# Patient Record
Sex: MALE | Race: BLACK OR AFRICAN AMERICAN | NOT HISPANIC OR LATINO
[De-identification: names, ages, dates, MRNs, and addresses within clinical notes are randomized per-mention and may not be internally consistent; named-entity substitution may affect disease eponyms.]

---

## 2023-08-25 ENCOUNTER — TRANSCRIPTION ENCOUNTER (OUTPATIENT)
Age: 53
End: 2023-08-25

## 2023-08-26 ENCOUNTER — INPATIENT (INPATIENT)
Facility: HOSPITAL | Age: 53
LOS: 8 days | Discharge: HOME | End: 2023-09-04
Attending: INTERNAL MEDICINE | Admitting: INTERNAL MEDICINE
Payer: MEDICAID

## 2023-08-26 VITALS
HEART RATE: 98 BPM | OXYGEN SATURATION: 99 % | WEIGHT: 151.9 LBS | TEMPERATURE: 99 F | DIASTOLIC BLOOD PRESSURE: 91 MMHG | RESPIRATION RATE: 18 BRPM | SYSTOLIC BLOOD PRESSURE: 128 MMHG

## 2023-08-26 LAB
ALBUMIN SERPL ELPH-MCNC: 3.3 G/DL — SIGNIFICANT CHANGE UP (ref 3.3–5)
ALP SERPL-CCNC: 61 U/L — SIGNIFICANT CHANGE UP (ref 40–120)
ALT FLD-CCNC: 26 U/L — SIGNIFICANT CHANGE UP (ref 12–78)
ANION GAP SERPL CALC-SCNC: 5 MMOL/L — SIGNIFICANT CHANGE UP (ref 5–17)
APPEARANCE UR: ABNORMAL
AST SERPL-CCNC: 21 U/L — SIGNIFICANT CHANGE UP (ref 15–37)
BACTERIA # UR AUTO: ABNORMAL
BASOPHILS # BLD AUTO: 0.05 K/UL — SIGNIFICANT CHANGE UP (ref 0–0.2)
BASOPHILS NFR BLD AUTO: 0.3 % — SIGNIFICANT CHANGE UP (ref 0–2)
BILIRUB SERPL-MCNC: 0.6 MG/DL — SIGNIFICANT CHANGE UP (ref 0.2–1.2)
BILIRUB UR-MCNC: NEGATIVE — SIGNIFICANT CHANGE UP
BLD GP AB SCN SERPL QL: SIGNIFICANT CHANGE UP
BUN SERPL-MCNC: 30 MG/DL — HIGH (ref 7–23)
CALCIUM SERPL-MCNC: 9.4 MG/DL — SIGNIFICANT CHANGE UP (ref 8.5–10.1)
CHLORIDE SERPL-SCNC: 104 MMOL/L — SIGNIFICANT CHANGE UP (ref 96–108)
CK SERPL-CCNC: 194 U/L — SIGNIFICANT CHANGE UP (ref 26–308)
CO2 SERPL-SCNC: 31 MMOL/L — SIGNIFICANT CHANGE UP (ref 22–31)
COD CRY URNS QL: ABNORMAL
COLOR SPEC: YELLOW — SIGNIFICANT CHANGE UP
COMMENT - URINE: SIGNIFICANT CHANGE UP
CREAT SERPL-MCNC: 1.45 MG/DL — HIGH (ref 0.5–1.3)
DIFF PNL FLD: ABNORMAL
EGFR: 58 ML/MIN/1.73M2 — LOW
EOSINOPHIL # BLD AUTO: 0.08 K/UL — SIGNIFICANT CHANGE UP (ref 0–0.5)
EOSINOPHIL NFR BLD AUTO: 0.5 % — SIGNIFICANT CHANGE UP (ref 0–6)
EPI CELLS # UR: ABNORMAL
GLUCOSE SERPL-MCNC: 140 MG/DL — HIGH (ref 70–99)
GLUCOSE UR QL: NEGATIVE MG/DL — SIGNIFICANT CHANGE UP
HCT VFR BLD CALC: 43.9 % — SIGNIFICANT CHANGE UP (ref 39–50)
HGB BLD-MCNC: 15.2 G/DL — SIGNIFICANT CHANGE UP (ref 13–17)
IMM GRANULOCYTES NFR BLD AUTO: 0.3 % — SIGNIFICANT CHANGE UP (ref 0–0.9)
INR BLD: 1.08 RATIO — SIGNIFICANT CHANGE UP (ref 0.85–1.18)
KETONES UR-MCNC: ABNORMAL
LACTATE SERPL-SCNC: 1.2 MMOL/L — SIGNIFICANT CHANGE UP (ref 0.7–2)
LACTATE SERPL-SCNC: 3.2 MMOL/L — HIGH (ref 0.7–2)
LEUKOCYTE ESTERASE UR-ACNC: ABNORMAL
LIDOCAIN IGE QN: 41 U/L — SIGNIFICANT CHANGE UP (ref 13–75)
LYMPHOCYTES # BLD AUTO: 24.9 % — SIGNIFICANT CHANGE UP (ref 13–44)
LYMPHOCYTES # BLD AUTO: 3.65 K/UL — HIGH (ref 1–3.3)
MCHC RBC-ENTMCNC: 28.5 PG — SIGNIFICANT CHANGE UP (ref 27–34)
MCHC RBC-ENTMCNC: 34.6 G/DL — SIGNIFICANT CHANGE UP (ref 32–36)
MCV RBC AUTO: 82.2 FL — SIGNIFICANT CHANGE UP (ref 80–100)
MONOCYTES # BLD AUTO: 1.29 K/UL — HIGH (ref 0–0.9)
MONOCYTES NFR BLD AUTO: 8.8 % — SIGNIFICANT CHANGE UP (ref 2–14)
NEUTROPHILS # BLD AUTO: 9.52 K/UL — HIGH (ref 1.8–7.4)
NEUTROPHILS NFR BLD AUTO: 65.2 % — SIGNIFICANT CHANGE UP (ref 43–77)
NITRITE UR-MCNC: NEGATIVE — SIGNIFICANT CHANGE UP
NRBC # BLD: 0 /100 WBCS — SIGNIFICANT CHANGE UP (ref 0–0)
PH UR: 7 — SIGNIFICANT CHANGE UP (ref 5–8)
PLATELET # BLD AUTO: 267 K/UL — SIGNIFICANT CHANGE UP (ref 150–400)
POTASSIUM SERPL-MCNC: 4.8 MMOL/L — SIGNIFICANT CHANGE UP (ref 3.5–5.3)
POTASSIUM SERPL-SCNC: 4.8 MMOL/L — SIGNIFICANT CHANGE UP (ref 3.5–5.3)
PROT SERPL-MCNC: 7 GM/DL — SIGNIFICANT CHANGE UP (ref 6–8.3)
PROT UR-MCNC: 100 MG/DL
PROTHROM AB SERPL-ACNC: 12.9 SEC — SIGNIFICANT CHANGE UP (ref 9.5–13)
RBC # BLD: 5.34 M/UL — SIGNIFICANT CHANGE UP (ref 4.2–5.8)
RBC # FLD: 12.6 % — SIGNIFICANT CHANGE UP (ref 10.3–14.5)
RBC CASTS # UR COMP ASSIST: ABNORMAL /HPF (ref 0–4)
SODIUM SERPL-SCNC: 140 MMOL/L — SIGNIFICANT CHANGE UP (ref 135–145)
SP GR SPEC: 1.01 — SIGNIFICANT CHANGE UP (ref 1.01–1.02)
TROPONIN I, HIGH SENSITIVITY RESULT: 4.3 NG/L — SIGNIFICANT CHANGE UP
UROBILINOGEN FLD QL: NEGATIVE MG/DL — SIGNIFICANT CHANGE UP
WBC # BLD: 14.63 K/UL — HIGH (ref 3.8–10.5)
WBC # FLD AUTO: 14.63 K/UL — HIGH (ref 3.8–10.5)
WBC UR QL: ABNORMAL

## 2023-08-26 PROCEDURE — 71045 X-RAY EXAM CHEST 1 VIEW: CPT | Mod: 26

## 2023-08-26 PROCEDURE — 99222 1ST HOSP IP/OBS MODERATE 55: CPT

## 2023-08-26 PROCEDURE — 99291 CRITICAL CARE FIRST HOUR: CPT | Mod: 25

## 2023-08-26 PROCEDURE — 70450 CT HEAD/BRAIN W/O DYE: CPT | Mod: 26

## 2023-08-26 PROCEDURE — 93010 ELECTROCARDIOGRAM REPORT: CPT

## 2023-08-26 PROCEDURE — 74177 CT ABD & PELVIS W/CONTRAST: CPT | Mod: 26,MA

## 2023-08-26 RX ORDER — SODIUM CHLORIDE 9 MG/ML
1000 INJECTION INTRAMUSCULAR; INTRAVENOUS; SUBCUTANEOUS ONCE
Refills: 0 | Status: COMPLETED | OUTPATIENT
Start: 2023-08-26 | End: 2023-08-26

## 2023-08-26 RX ORDER — HYDROMORPHONE HYDROCHLORIDE 2 MG/ML
0.5 INJECTION INTRAMUSCULAR; INTRAVENOUS; SUBCUTANEOUS EVERY 4 HOURS
Refills: 0 | Status: DISCONTINUED | OUTPATIENT
Start: 2023-08-26 | End: 2023-08-28

## 2023-08-26 RX ORDER — SODIUM CHLORIDE 9 MG/ML
1000 INJECTION INTRAMUSCULAR; INTRAVENOUS; SUBCUTANEOUS
Refills: 0 | Status: DISCONTINUED | OUTPATIENT
Start: 2023-08-26 | End: 2023-08-31

## 2023-08-26 RX ORDER — MORPHINE SULFATE 50 MG/1
4 CAPSULE, EXTENDED RELEASE ORAL ONCE
Refills: 0 | Status: DISCONTINUED | OUTPATIENT
Start: 2023-08-26 | End: 2023-08-26

## 2023-08-26 RX ORDER — PANTOPRAZOLE SODIUM 20 MG/1
40 TABLET, DELAYED RELEASE ORAL DAILY
Refills: 0 | Status: DISCONTINUED | OUTPATIENT
Start: 2023-08-27 | End: 2023-08-31

## 2023-08-26 RX ORDER — ONDANSETRON 8 MG/1
4 TABLET, FILM COATED ORAL EVERY 8 HOURS
Refills: 0 | Status: DISCONTINUED | OUTPATIENT
Start: 2023-08-26 | End: 2023-08-31

## 2023-08-26 RX ORDER — CEFTRIAXONE 500 MG/1
1000 INJECTION, POWDER, FOR SOLUTION INTRAMUSCULAR; INTRAVENOUS EVERY 24 HOURS
Refills: 0 | Status: DISCONTINUED | OUTPATIENT
Start: 2023-08-27 | End: 2023-08-31

## 2023-08-26 RX ORDER — ONDANSETRON 8 MG/1
4 TABLET, FILM COATED ORAL ONCE
Refills: 0 | Status: COMPLETED | OUTPATIENT
Start: 2023-08-26 | End: 2023-08-26

## 2023-08-26 RX ORDER — PANTOPRAZOLE SODIUM 20 MG/1
8 TABLET, DELAYED RELEASE ORAL
Qty: 80 | Refills: 0 | Status: DISCONTINUED | OUTPATIENT
Start: 2023-08-26 | End: 2023-08-26

## 2023-08-26 RX ORDER — PANTOPRAZOLE SODIUM 20 MG/1
80 TABLET, DELAYED RELEASE ORAL ONCE
Refills: 0 | Status: COMPLETED | OUTPATIENT
Start: 2023-08-26 | End: 2023-08-26

## 2023-08-26 RX ORDER — CEFTRIAXONE 500 MG/1
1000 INJECTION, POWDER, FOR SOLUTION INTRAMUSCULAR; INTRAVENOUS ONCE
Refills: 0 | Status: COMPLETED | OUTPATIENT
Start: 2023-08-26 | End: 2023-08-26

## 2023-08-26 RX ORDER — HEPARIN SODIUM 5000 [USP'U]/ML
5000 INJECTION INTRAVENOUS; SUBCUTANEOUS EVERY 12 HOURS
Refills: 0 | Status: DISCONTINUED | OUTPATIENT
Start: 2023-08-26 | End: 2023-08-31

## 2023-08-26 RX ADMIN — SODIUM CHLORIDE 1000 MILLILITER(S): 9 INJECTION INTRAMUSCULAR; INTRAVENOUS; SUBCUTANEOUS at 05:09

## 2023-08-26 RX ADMIN — CEFTRIAXONE 100 MILLIGRAM(S): 500 INJECTION, POWDER, FOR SOLUTION INTRAMUSCULAR; INTRAVENOUS at 06:53

## 2023-08-26 RX ADMIN — SODIUM CHLORIDE 1000 MILLILITER(S): 9 INJECTION INTRAMUSCULAR; INTRAVENOUS; SUBCUTANEOUS at 02:19

## 2023-08-26 RX ADMIN — SODIUM CHLORIDE 100 MILLILITER(S): 9 INJECTION INTRAMUSCULAR; INTRAVENOUS; SUBCUTANEOUS at 12:54

## 2023-08-26 RX ADMIN — ONDANSETRON 4 MILLIGRAM(S): 8 TABLET, FILM COATED ORAL at 02:20

## 2023-08-26 RX ADMIN — PANTOPRAZOLE SODIUM 80 MILLIGRAM(S): 20 TABLET, DELAYED RELEASE ORAL at 02:49

## 2023-08-26 RX ADMIN — HYDROMORPHONE HYDROCHLORIDE 0.5 MILLIGRAM(S): 2 INJECTION INTRAMUSCULAR; INTRAVENOUS; SUBCUTANEOUS at 13:40

## 2023-08-26 RX ADMIN — PANTOPRAZOLE SODIUM 10 MG/HR: 20 TABLET, DELAYED RELEASE ORAL at 10:53

## 2023-08-26 RX ADMIN — PANTOPRAZOLE SODIUM 10 MG/HR: 20 TABLET, DELAYED RELEASE ORAL at 03:37

## 2023-08-26 RX ADMIN — HYDROMORPHONE HYDROCHLORIDE 0.5 MILLIGRAM(S): 2 INJECTION INTRAMUSCULAR; INTRAVENOUS; SUBCUTANEOUS at 20:45

## 2023-08-26 RX ADMIN — MORPHINE SULFATE 4 MILLIGRAM(S): 50 CAPSULE, EXTENDED RELEASE ORAL at 02:21

## 2023-08-26 RX ADMIN — HEPARIN SODIUM 5000 UNIT(S): 5000 INJECTION INTRAVENOUS; SUBCUTANEOUS at 17:30

## 2023-08-26 RX ADMIN — HYDROMORPHONE HYDROCHLORIDE 0.5 MILLIGRAM(S): 2 INJECTION INTRAMUSCULAR; INTRAVENOUS; SUBCUTANEOUS at 12:54

## 2023-08-26 NOTE — ED ADULT NURSE REASSESSMENT NOTE - NS ED NURSE REASSESS COMMENT FT1
Pt suddenly experienced coffee ground emesis. Dr. Briceño at bedside. Obtained 2 sites IV access. Bloodwork sent to lab. Pt started on IV fluids. Taken to radiology stat, 2 nurses attended. Pt remained on cardiac monitor. Pt safely returned to his room, medication for nausea and pain given without incident. Family remains at bedside.

## 2023-08-26 NOTE — ED ADULT NURSE REASSESSMENT NOTE - NS ED NURSE REASSESS COMMENT FT1
patient received laying comfortable in stretcher, alert and oriented x4. pt has NGT in right nare with protonix drip and on continuos cardiac monitor and pulse ox. pt in no acute respiratory distress or discomfort at this time. pt admitted waiting for bed. fall precautions and safety precautions maintained.

## 2023-08-26 NOTE — H&P ADULT - NSHPLABSRESULTS_GEN_ALL_CORE
LABS:                        15.2   14.63 )-----------( 267      ( 26 Aug 2023 02:04 )             43.9         140  |  104  |  30<H>  ----------------------------<  140<H>  4.8   |  31  |  1.45<H>    Ca    9.4      26 Aug 2023 02:04    TPro  7.0  /  Alb  3.3  /  TBili  0.6  /  DBili  x   /  AST  21  /  ALT  26  /  AlkPhos  61      PT/INR - ( 26 Aug 2023 02:09 )   PT: 12.9 sec;   INR: 1.08 ratio           CARDIAC MARKERS ( 26 Aug 2023 02:04 )  x     / x     / 194 U/L / x     / x          Urinalysis Basic - ( 26 Aug 2023 05:26 )    Color: Yellow / Appearance: Slightly Turbid / S.010 / pH: x  Gluc: x / Ketone: Moderate  / Bili: Negative / Urobili: Negative mg/dL   Blood: x / Protein: 100 mg/dL / Nitrite: Negative   Leuk Esterase: Moderate / RBC: 25-50 /HPF / WBC 26-50   Sq Epi: x / Non Sq Epi: x / Bacteria: Moderate      Lactate, Blood: 1.2 mmol/L ( @ 06:45)  Lactate, Blood: 3.2 mmol/L ( @ 02:04)
Yes

## 2023-08-26 NOTE — ED PROVIDER NOTE - CARE PLAN
Principal Discharge DX:	Syncope  Secondary Diagnosis:	UTI (urinary tract infection)  Secondary Diagnosis:	Bilateral flank pain  Secondary Diagnosis:	Coffee ground emesis  Secondary Diagnosis:	Orthostatic hypotension   1

## 2023-08-26 NOTE — ED ADULT NURSE REASSESSMENT NOTE - NS ED NURSE REASSESS COMMENT FT1
Safety checks completed and maintained. IV sites checked and remains patent. medications given as ordered with no s/s of adverse reactions. NG tube remains in place. Pt still on cardiac monitor. Fall and skin precautions in place. Call bell within reach and safety measures in place.

## 2023-08-26 NOTE — ED ADULT NURSE REASSESSMENT NOTE - NS ED NURSE REASSESS COMMENT FT1
Safety checks completed and maintained. Bloodwork sent to lab and medication given  with no signs of adverse reactions. Pt a&ox4, with family still at bedside. IV sites checked and remains WDL. Fall and skin precautions in place. Call bell within reach and safety measures in place.

## 2023-08-26 NOTE — H&P ADULT - NSHPREVIEWOFSYSTEMS_GEN_ALL_CORE
REVIEW OF SYSTEMS:  CONSTITUTIONAL: No fever,  no  weight loss  ENT:  No  tinnitus,   no   vertigo  NECK: No pain or stiffness  RESPIRATORY: No cough, wheezing, chills or hemoptysis;    No Shortness of Breath  CARDIOVASCULAR: No chest pain, palpitations, dizziness  GASTROINTESTINAL: No abdominal or epigastric pain.   +  nausea, vomiting, o no hematemesis; No diarrhea  No melena or hematochezia.  GENITOURINARY: No dysuria, frequency, hematuria, or incontinence  NEUROLOGICAL: No headaches  SKIN: No itching,  no   rash  LYMPH Nodes: No enlarged glands  ENDOCRINE: No heat or cold intolerance  MUSCULOSKELETAL: No joint pain or swelling  PSYCHIATRIC: No depression, anxiety  HEME/LYMPH: No easy bruising, or bleeding gums  ALLERGY AND IMMUNOLOGIC: No hives or eczema

## 2023-08-26 NOTE — ED PROVIDER NOTE - WET READ LAUNCH FT
Problem: Anxiety  Goal: Anxiety Reduction or Resolution  Outcome: Improving     Pt mildly anxious today and gave 2mg PRN valium to help keep him more calm. Tremors are still present baseline in Bilateral upper extremities and RLE. No pain noted. On RA sating fine. Waiting TCU placement.    Selina Montiel RN     There are no Wet Read(s) to document. There is 1 Wet Read(s) to document.
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
## 2023-08-26 NOTE — H&P ADULT - NSHPPHYSICALEXAM_GEN_ALL_CORE
T(C): 36.7 (08-26-23 @ 11:07), Max: 37.1 (08-26-23 @ 01:09)  HR: 78 (08-26-23 @ 11:07) (78 - 98)  BP: 122/81 (08-26-23 @ 11:07) (119/79 - 134/90)  RR: 18 (08-26-23 @ 11:07) (14 - 18)  SpO2: 99% (08-26-23 @ 11:07) (97% - 99%)  GENERAL: NAD, lying in bed comfortably  HEAD:  Atraumatic, normocephalic  EYES: EOMI, PERRLA, conjunctiva and sclera clear  NECK: Supple, trachea midline, no JVD  HEART: Regular rate and rhythm, no murmurs, rubs, or gallops  LUNGS: Unlabored respirations.  Clear to auscultation bilaterally, no crackles, wheezing, or rhonchi  ABDOMEN: Soft, nontender, nondistended, +BS    b/l  flank  tenderness  EXTREMITIES: 2+ peripheral pulses bilaterally. No clubbing, cyanosis, or edema  NERVOUS SYSTEM:  A&Ox3, moving all extremities, no focal deficits   SKIN: No rashes or lesions

## 2023-08-26 NOTE — ED PROVIDER NOTE - OBJECTIVE STATEMENT
This patient is a 53 year old man with no known past medical history who presents to the ER c/o intermittent hematuria for a few months.  He denies fever and vomiting but does report intermittent bilateral flank pain right greater than left currently 8/10 in severity.  Today after getting up from bed and urinating he had a syncopal episode.  He was able to  himself from the ground went upstairs to another area of the house and passed out again in front of his wife.  No chest pain, SOB, or focal weakness.  He has never had these symptoms in the past.

## 2023-08-26 NOTE — ED ADULT NURSE NOTE - NSFALLRISKINTERV_ED_ALL_ED
Assistance OOB with selected safe patient handling equipment if applicable/Communicate fall risk and risk factors to all staff, patient, and family/Orthostatic vital signs/Provide visual cue: yellow wristband, yellow gown, etc/Reinforce activity limits and safety measures with patient and family/Call bell, personal items and telephone in reach/Instruct patient to call for assistance before getting out of bed/chair/stretcher/Non-slip footwear applied when patient is off stretcher/[place] to call system/Physically safe environment - no spills, clutter or unnecessary equipment/Purposeful Proactive Rounding/Room/bathroom lighting operational, light cord in reach

## 2023-08-26 NOTE — ED ADULT NURSE NOTE - ED STAT RN HANDOFF DETAILS
report given to JEANMARIE Jeffries in ED Hold. pt on continuos cardiac monitor, NSR, and pulse ox, in no acute respiratory distress or discomfort at this time. denies pain or discomfort at this moment. fall precautions and safety precautions maintained.
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
## 2023-08-26 NOTE — ED ADULT NURSE NOTE - PRO INTERPRETER NEED 2
Pt seen in office 6/17 by Dr. Sinha and was prescribed Eliquis. Dtr. Faye calling asking if Rx can be changed to 3 months to use [facility] mailorder pharmacy.     Please review and advise. Call Faye at [phone] to update or for questions.    Raghav Vigil , [facility] [place]  06/29/20 4:15 PM     
Routing refill request to provider for review/approval because:  Outside of age range.     Patient requesting a 3 month supply mail order.     Katiana Ramirez RN on 6/30/2020 at 9:12 AM          
English

## 2023-08-26 NOTE — H&P ADULT - ASSESSMENT
53  yr  m    with   no  pmx/  an d takes  no  meds      c/o  b/l flank pain  fpr  past 4  months or  more    fiancee  at bedside    denies   fevers/ n// cp/sob/  ab d pain    had  vomiting   fpr  past  2 days. , and   er  had  placed   an  ng  tube/  no  further   vomiting      admitted with  b.l  flank pain. form  kidney stone       CT a/p,  2  cm R ,a  nd  2.1  cm L calculi,  renal pelvis,  stranding   noted,              atherosclerosis,  PAD, L and R Internal  Iliac A   elevated  wbc/  pyelonephritis    on iv  fluids    pain  meds    iv  rocephin/  follow   bcx/  ucx       Urology/ Vascular   PA  for  Saturday,  Brice , notified        will  need  vacsular  eval   echo ordered/  may need  ischemic  w/p    on dvt ppx    my scheduled  shift ends  at 4  pm     rad< from: CT Abdomen and Pelvis w/ IV Cont (08.26.23 @ 02:17) >  IMPRESSION:  Large calculi within the bilateral renal pelves measuring up to 2.0 cm on   the right and 2.1 cm on the left with associated mild hydronephrosis.  Mild stranding surrounding the right greater than left renal pelvis and   proximal ureter. Correlate with urinalysis for superimposed infection.  Atherosclerotic changes with moderate narrowing of the left common iliac   artery and severe narrowing of the right internal iliac artery.  --- End of Report ---  BRIDGET DUMONT MD; Attending Radiologist  This document has been electronically signed. Aug 26 2023  3:14AM  < end of copied text >      53  yr  m    with   no  pmx/  an d takes  no  meds      c/o  b/l flank pain  fpr  past 4  months or  more    fiancee  at bedside    denies   fevers/ n// cp/sob/  ab d pain    had  vomiting   fpr  past  2 days. , and   er  had  placed   an  ng  tube/  no  further   vomiting      admitted with  b.l   flank pain. form  kidney stones       CT a/p,  2  cm R ,a  nd  2.1  cm L calculi,  renal pelvis,  stranding   noted,              atherosclerosis,  PAD, L and R Internal  Iliac A    elevated  wbc/  pyelonephritis    on iv  fluids    pain  meds    iv  rocephin/  follow   bcx/  ucx       Urology/ Vascular   PA  for  Saturday,  Brice , notified        will  need  vacsular  eval   echo ordered/  may need  ischemic  w/p     may  remove  ng tube  later, if  no further vomiting   is  noted   on dvt ppx    my scheduled  shift ends  at 4  pm     rad< from: CT Abdomen and Pelvis w/ IV Cont (08.26.23 @ 02:17) >  IMPRESSION:  Large calculi within the bilateral renal pelves measuring up to 2.0 cm on   the right and 2.1 cm on the left with associated mild hydronephrosis.  Mild stranding surrounding the right greater than left renal pelvis and   proximal ureter. Correlate with urinalysis for superimposed infection.  Atherosclerotic changes with moderate narrowing of the left common iliac   artery and severe narrowing of the right internal iliac artery.  --- End of Report ---  BRIDGET DUMONT MD; Attending Radiologist  This document has been electronically signed. Aug 26 2023  3:14AM  < end of copied text >      53  yr  m    with   no  pmx/  an d takes  no  meds      c/o  b/l flank pain  fpr  past 4  months or  more    fiancee  at bedside    denies   fevers/ n// cp/sob/  ab d pain    had  vomiting   fpr  past  2 days. , and   er  had  placed   an  ng  tube/  no  further   vomiting      admitted with  b./l   flank pain. form  kidney stones       CT a/p,  2  cm R ,a  nd  2.1  cm L calculi,  renal pelvis,  stranding   noted,              atherosclerosis,  PAD, L and R Internal  Iliac A    elevated  wbc/  pyelonephritis    on iv  fluids    pain  meds    iv  rocephin/  follow   bcx/  ucx       Urology/ Vascular   PA  for  Saturday,  Brice , notified        will  need  vacsular  eval    also,  pt   had  jossie    syncopal events   at  home/   had   3  episodes  tody. . per  fiancee         tele  monitoring          no seizures         has  no  focal  deficits         Ct    head,   ordered        echo ordered/  may need  ischemic  w/p         card  eval  in am      may  remove  ng tube  later, if  no further vomiting   is  noted   on dvt ppx    my scheduled  shift ends  at 4  pm     rad< from: CT Abdomen and Pelvis w/ IV Cont (08.26.23 @ 02:17) >  IMPRESSION:  Large calculi within the bilateral renal pelves measuring up to 2.0 cm on   the right and 2.1 cm on the left with associated mild hydronephrosis.  Mild stranding surrounding the right greater than left renal pelvis and   proximal ureter. Correlate with urinalysis for superimposed infection.  Atherosclerotic changes with moderate narrowing of the left common iliac   artery and severe narrowing of the right internal iliac artery.  --- End of Report ---  BRIDGET DUMONT MD; Attending Radiologist  This document has been electronically signed. Aug 26 2023  3:14AM  < end of copied text >      53  yr  m    with   no  pmx/  and  takes  no  meds      c/o  b/l flank pain  for  past 4  months or  more    fiancee  at bedside/   denies   fevers/ n// cp/sob/  ab d pain    had  vomiting   fpr  past  2 days., and   er  had  placed   an  ng  tube/  no  further   vomiting    denies  rectal  bleed/  hemtemesis      *  admitted with  b./l   flank pain. form  kidney stones             CT a/p,  2  cm R ,a  nd  2.1  cm L calculi,  renal pelvis,  stranding   noted,              atherosclerosis,  PAD, L and R Internal  Iliac A    elevated  wbc/  pyelonephritis    on iv  fluids    pain  meds    iv  rocephin/  follow   bcx/  ucx     Urology/ Vascular   PA  for  Saturday,  Brice , notified            will  need  vascular   eval  *   also,  pt   had  jossie    syncopal events   at  home/   had   3  episodes  tody.. per  fiancee          tele  monitoring          no seizures         has  no  focal  deficits         Ct    head,   ordered         echo ordered/  may need  ischemic  w/p         card  eval  in am         may  remove  ng tube  later, if  no further vomiting  noted   on dvt ppx    my scheduled  shift ends  at 4  pm     rad< from: CT Abdomen and Pelvis w/ IV Cont (08.26.23 @ 02:17) >  IMPRESSION:  Large calculi within the bilateral renal pelves measuring up to 2.0 cm on   the right and 2.1 cm on the left with associated mild hydronephrosis.  Mild stranding surrounding the right greater than left renal pelvis and   proximal ureter. Correlate with urinalysis for superimposed infection.  Atherosclerotic changes with moderate narrowing of the left common iliac   artery and severe narrowing of the right internal iliac artery.  --- End of Report ---  BRIDGET DUMONT MD; Attending Radiologist  This document has been electronically signed. Aug 26 2023  3:14AM  < end of copied text >
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
## 2023-08-26 NOTE — ED PROVIDER NOTE - CLINICAL SUMMARY MEDICAL DECISION MAKING FREE TEXT BOX
Hematuria intermittent for a few weeks and 2 episodes of syncope today patient weak appearing slight scleral icterus.  Patient developed nausea and had moderate amount of coffee ground emesis at bedside during assessment r/o perforated viscus, UTI, renal colic.  Will give protonix, check labs, CT and Admit.    CT shows no acute pathology CAROLYN noted, IVF given and patient admitted.

## 2023-08-26 NOTE — H&P ADULT - HISTORY OF PRESENT ILLNESS
53  yr  m    with   no  pmx/  an d takes  no  meds      c/o  b/l flank pain  fpr  past 4  months or  more    fiancee  at bedside    denies   fevers/ n// cp/sob/  ab d pain    had  vomiting   fpr  past  2 days. , and   er  had  placed   an  ng  tube    no  further   vomiting

## 2023-08-26 NOTE — ED ADULT TRIAGE NOTE - CHIEF COMPLAINT QUOTE
pain right flank and dizziness for a couple of months  and hematuria for a couple of months today passed out twice after going to the bathroom.

## 2023-08-26 NOTE — PATIENT PROFILE ADULT - FALL HARM RISK - HARM RISK INTERVENTIONS

## 2023-08-27 LAB
A1C WITH ESTIMATED AVERAGE GLUCOSE RESULT: 6.2 % — HIGH (ref 4–5.6)
ANION GAP SERPL CALC-SCNC: 4 MMOL/L — LOW (ref 5–17)
BUN SERPL-MCNC: 15 MG/DL — SIGNIFICANT CHANGE UP (ref 7–23)
CALCIUM SERPL-MCNC: 8.3 MG/DL — LOW (ref 8.5–10.1)
CHLORIDE SERPL-SCNC: 110 MMOL/L — HIGH (ref 96–108)
CHOLEST SERPL-MCNC: 125 MG/DL — SIGNIFICANT CHANGE UP
CO2 SERPL-SCNC: 30 MMOL/L — SIGNIFICANT CHANGE UP (ref 22–31)
CREAT SERPL-MCNC: 1.27 MG/DL — SIGNIFICANT CHANGE UP (ref 0.5–1.3)
CULTURE RESULTS: SIGNIFICANT CHANGE UP
EGFR: 68 ML/MIN/1.73M2 — SIGNIFICANT CHANGE UP
ESTIMATED AVERAGE GLUCOSE: 131 MG/DL — HIGH (ref 68–114)
GLUCOSE SERPL-MCNC: 129 MG/DL — HIGH (ref 70–99)
HCT VFR BLD CALC: 30.6 % — LOW (ref 39–50)
HCT VFR BLD CALC: 32.1 % — LOW (ref 39–50)
HDLC SERPL-MCNC: 39 MG/DL — LOW
HGB BLD-MCNC: 10.8 G/DL — LOW (ref 13–17)
HGB BLD-MCNC: 11.1 G/DL — LOW (ref 13–17)
LIPID PNL WITH DIRECT LDL SERPL: 69 MG/DL — SIGNIFICANT CHANGE UP
MCHC RBC-ENTMCNC: 28.8 PG — SIGNIFICANT CHANGE UP (ref 27–34)
MCHC RBC-ENTMCNC: 28.8 PG — SIGNIFICANT CHANGE UP (ref 27–34)
MCHC RBC-ENTMCNC: 32.3 G/DL — SIGNIFICANT CHANGE UP (ref 32–36)
MCHC RBC-ENTMCNC: 34.6 G/DL — SIGNIFICANT CHANGE UP (ref 32–36)
MCV RBC AUTO: 81.6 FL — SIGNIFICANT CHANGE UP (ref 80–100)
MCV RBC AUTO: 83.2 FL — SIGNIFICANT CHANGE UP (ref 80–100)
NON HDL CHOLESTEROL: 85 MG/DL — SIGNIFICANT CHANGE UP
NRBC # BLD: 0 /100 WBCS — SIGNIFICANT CHANGE UP (ref 0–0)
NRBC # BLD: 0 /100 WBCS — SIGNIFICANT CHANGE UP (ref 0–0)
PLATELET # BLD AUTO: 188 K/UL — SIGNIFICANT CHANGE UP (ref 150–400)
PLATELET # BLD AUTO: 196 K/UL — SIGNIFICANT CHANGE UP (ref 150–400)
POTASSIUM SERPL-MCNC: 4.4 MMOL/L — SIGNIFICANT CHANGE UP (ref 3.5–5.3)
POTASSIUM SERPL-SCNC: 4.4 MMOL/L — SIGNIFICANT CHANGE UP (ref 3.5–5.3)
RBC # BLD: 3.75 M/UL — LOW (ref 4.2–5.8)
RBC # BLD: 3.86 M/UL — LOW (ref 4.2–5.8)
RBC # FLD: 12.7 % — SIGNIFICANT CHANGE UP (ref 10.3–14.5)
RBC # FLD: 12.8 % — SIGNIFICANT CHANGE UP (ref 10.3–14.5)
SODIUM SERPL-SCNC: 144 MMOL/L — SIGNIFICANT CHANGE UP (ref 135–145)
SPECIMEN SOURCE: SIGNIFICANT CHANGE UP
TRIGL SERPL-MCNC: 85 MG/DL — SIGNIFICANT CHANGE UP
WBC # BLD: 8.32 K/UL — SIGNIFICANT CHANGE UP (ref 3.8–10.5)
WBC # BLD: 8.8 K/UL — SIGNIFICANT CHANGE UP (ref 3.8–10.5)
WBC # FLD AUTO: 8.32 K/UL — SIGNIFICANT CHANGE UP (ref 3.8–10.5)
WBC # FLD AUTO: 8.8 K/UL — SIGNIFICANT CHANGE UP (ref 3.8–10.5)

## 2023-08-27 PROCEDURE — 93306 TTE W/DOPPLER COMPLETE: CPT | Mod: 26

## 2023-08-27 PROCEDURE — 93925 LOWER EXTREMITY STUDY: CPT | Mod: 26

## 2023-08-27 PROCEDURE — 99233 SBSQ HOSP IP/OBS HIGH 50: CPT

## 2023-08-27 PROCEDURE — 99232 SBSQ HOSP IP/OBS MODERATE 35: CPT

## 2023-08-27 RX ADMIN — HYDROMORPHONE HYDROCHLORIDE 0.5 MILLIGRAM(S): 2 INJECTION INTRAMUSCULAR; INTRAVENOUS; SUBCUTANEOUS at 14:08

## 2023-08-27 RX ADMIN — Medication 1 TABLET(S): at 17:23

## 2023-08-27 RX ADMIN — PANTOPRAZOLE SODIUM 40 MILLIGRAM(S): 20 TABLET, DELAYED RELEASE ORAL at 17:23

## 2023-08-27 RX ADMIN — HYDROMORPHONE HYDROCHLORIDE 0.5 MILLIGRAM(S): 2 INJECTION INTRAMUSCULAR; INTRAVENOUS; SUBCUTANEOUS at 21:16

## 2023-08-27 RX ADMIN — CEFTRIAXONE 100 MILLIGRAM(S): 500 INJECTION, POWDER, FOR SOLUTION INTRAMUSCULAR; INTRAVENOUS at 05:33

## 2023-08-27 RX ADMIN — HEPARIN SODIUM 5000 UNIT(S): 5000 INJECTION INTRAVENOUS; SUBCUTANEOUS at 17:23

## 2023-08-27 RX ADMIN — HYDROMORPHONE HYDROCHLORIDE 0.5 MILLIGRAM(S): 2 INJECTION INTRAMUSCULAR; INTRAVENOUS; SUBCUTANEOUS at 05:32

## 2023-08-27 RX ADMIN — HYDROMORPHONE HYDROCHLORIDE 0.5 MILLIGRAM(S): 2 INJECTION INTRAMUSCULAR; INTRAVENOUS; SUBCUTANEOUS at 15:00

## 2023-08-27 RX ADMIN — HEPARIN SODIUM 5000 UNIT(S): 5000 INJECTION INTRAVENOUS; SUBCUTANEOUS at 05:33

## 2023-08-27 RX ADMIN — SODIUM CHLORIDE 100 MILLILITER(S): 9 INJECTION INTRAMUSCULAR; INTRAVENOUS; SUBCUTANEOUS at 05:33

## 2023-08-27 RX ADMIN — SODIUM CHLORIDE 100 MILLILITER(S): 9 INJECTION INTRAMUSCULAR; INTRAVENOUS; SUBCUTANEOUS at 13:27

## 2023-08-27 NOTE — PROGRESS NOTE ADULT - ASSESSMENT
53  yr  M with   no  pmhx and  takes  no  meds c/o  b/l flank pain  for  past 4  months or  more. Denies   fevers/ chills/ cp/sob. Denies  rectal  bleed/  hematemesis        Pyelonephritis/Nephrolithiasis  -Present with b/l flank pain  -elevated wbc, elevated lactate, normalize after IVF  -UA positive for wbc, blood and LE  -CT A/P: Large calculi within the bilateral renal pelves measuring up to 2.0 cm on the right and 2.1 cm on the left with associated mild hydronephrosis  PLAN  -C/w Rocephin 1G  -C/w IVF  -Pain control  -F/u Ucx  -Urology consulted    PAD  - reports of intermittent numbness/pain in RLE  - Has not been to doctor for years  - CT A/P: Atherosclerotic changes with moderate narrowing of the left common iliac artery and severe narrowing of the right internal iliac artery  - TTE: Normal global left ventricular systolic function. No evidence of mitral valve regurgitation. Mild tricuspid regurgitatio  PLAN  - f/u A1C, lipid panel  - Vascular consult

## 2023-08-27 NOTE — PROGRESS NOTE ADULT - SUBJECTIVE AND OBJECTIVE BOX
Patient seen and examined at bedside, complaning of bilateral back pain  Denies difficulty urin      MEDICATIONS  (STANDING):  cefTRIAXone   IVPB 1000 milliGRAM(s) IV Intermittent every 24 hours  heparin   Injectable 5000 Unit(s) SubCutaneous every 12 hours  pantoprazole  Injectable 40 milliGRAM(s) IV Push daily  sodium chloride 0.9%. 1000 milliLiter(s) (100 mL/Hr) IV Continuous <Continuous>    MEDICATIONS  (PRN):  HYDROmorphone  Injectable 0.5 milliGRAM(s) IV Push every 4 hours PRN Moderate Pain (4 - 6)  ondansetron Injectable 4 milliGRAM(s) IV Push every 8 hours PRN Nausea and/or Vomiting      Vital Signs Last 24 Hrs  T(C): 36.7 (27 Aug 2023 11:09), Max: 37.2 (26 Aug 2023 17:51)  T(F): 98 (27 Aug 2023 11:09), Max: 99 (26 Aug 2023 17:51)  HR: 78 (27 Aug 2023 11:09) (78 - 84)  BP: 116/77 (27 Aug 2023 11:09) (104/69 - 124/78)  RR: 18 (27 Aug 2023 11:09) (18 - 18)  SpO2: 99% (27 Aug 2023 11:09) (97% - 99%)    Parameters below as of 27 Aug 2023 11:09  Patient On (Oxygen Delivery Method): room air      PHYSICAL EXAM:  General: NAD  Neuro:  Alert & oriented x 3  HEENT: NCAT, EOMI, conjunctiva clear  CV: +S1+S2  Lung: Respirations nonlabored, good inspiratory effort  Abdomen: soft, NTND  Extremities: no pedal edema or calf tenderness noted   Genitourinary: No suprapubic tenderness, no respiratory distress        LABS:                        10.8   8.32  )-----------( 196      ( 27 Aug 2023 08:22 )             30.6     08-27    144  |  110<H>  |  15  ----------------------------<  129<H>  4.4   |  30  |  1.27    Ca    8.3<L>      27 Aug 2023 08:22    TPro  7.0  /  Alb  3.3  /  TBili  0.6  /  DBili  x   /  AST  21  /  ALT  26  /  AlkPhos  61  08-26    PT/INR - ( 26 Aug 2023 02:09 )   PT: 12.9 sec;   INR: 1.08 ratio           Urinalysis Basic - ( 27 Aug 2023 08:22 )    Color: x / Appearance: x / SG: x / pH: x  Gluc: 129 mg/dL / Ketone: x  / Bili: x / Urobili: x   Blood: x / Protein: x / Nitrite: x   Leuk Esterase: x / RBC: x / WBC x   Sq Epi: x / Non Sq Epi: x / Bacteria: x
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
## 2023-08-27 NOTE — PROGRESS NOTE ADULT - SUBJECTIVE AND OBJECTIVE BOX
PROGRESS NOTE:     Patient is a 53y old  Male who presents with a chief complaint of flank  pain (26 Aug 2023 13:07)          SUBJECTIVE & OBJECTIVE:   Pt seen and examined at bedside in AM    no overnight events.   no new complaints    REVIEW OF SYSTEMS: remaining ROS negative     PHYSICAL EXAM:  VITALS:  Vital Signs Last 24 Hrs  T(C): 36.7 (27 Aug 2023 11:09), Max: 37.2 (26 Aug 2023 17:51)  T(F): 98 (27 Aug 2023 11:09), Max: 99 (26 Aug 2023 17:51)  HR: 78 (27 Aug 2023 11:09) (78 - 84)  BP: 116/77 (27 Aug 2023 11:09) (104/69 - 124/78)  BP(mean): --  RR: 18 (27 Aug 2023 11:09) (18 - 18)  SpO2: 99% (27 Aug 2023 11:09) (97% - 99%)    Parameters below as of 27 Aug 2023 11:09  Patient On (Oxygen Delivery Method): room air          GENERAL: NAD,  no increased WOB  HEAD:  Atraumatic, Normocephalic  EYES: EOMI, PERRLA, conjunctiva and sclera clear  ENMT: Moist mucous membranes  NECK: Supple, No JVD  NERVOUS SYSTEM:  Alert & Oriented X3, no focal neuro deficits   CHEST/LUNG: Clear to auscultation bilaterally; No rales, rhonchi, wheezing, or rubs  HEART: Regular rate and rhythm; No murmurs, rubs, or gallops  ABDOMEN: Soft, Nondistended; Bowel sounds present, CVA tenderness  EXTREMITIES:  2+ Peripheral Pulses b/l, No clubbing, cyanosis, calf tenderness or edema b/l      MEDICATIONS  (STANDING):  cefTRIAXone   IVPB 1000 milliGRAM(s) IV Intermittent every 24 hours  heparin   Injectable 5000 Unit(s) SubCutaneous every 12 hours  pantoprazole  Injectable 40 milliGRAM(s) IV Push daily  sodium chloride 0.9%. 1000 milliLiter(s) (100 mL/Hr) IV Continuous <Continuous>    MEDICATIONS  (PRN):  HYDROmorphone  Injectable 0.5 milliGRAM(s) IV Push every 4 hours PRN Moderate Pain (4 - 6)  ondansetron Injectable 4 milliGRAM(s) IV Push every 8 hours PRN Nausea and/or Vomiting      Allergies    No Known Allergies    Intolerances              LABS:                           10.8   8.32  )-----------( 196      ( 27 Aug 2023 08:22 )             30.6     08-27    144  |  110<H>  |  15  ----------------------------<  129<H>  4.4   |  30  |  1.27    Ca    8.3<L>      27 Aug 2023 08:22    TPro  7.0  /  Alb  3.3  /  TBili  0.6  /  DBili  x   /  AST  21  /  ALT  26  /  AlkPhos  61  08-26    PT/INR - ( 26 Aug 2023 02:09 )   PT: 12.9 sec;   INR: 1.08 ratio           Urinalysis Basic - ( 27 Aug 2023 08:22 )    Color: x / Appearance: x / SG: x / pH: x  Gluc: 129 mg/dL / Ketone: x  / Bili: x / Urobili: x   Blood: x / Protein: x / Nitrite: x   Leuk Esterase: x / RBC: x / WBC x   Sq Epi: x / Non Sq Epi: x / Bacteria: x      CAPILLARY BLOOD GLUCOSE          Culture - Urine (collected 26 Aug 2023 05:28)  Source: Clean Catch Clean Catch (Midstream)  Final Report (27 Aug 2023 07:53):    <10,000 CFU/mL Normal Urogenital Mini                RECENT CULTURES:          RADIOLOGY & ADDITIONAL TESTS:  < from: CT Head No Cont (08.26.23 @ 20:22) >  IMPRESSION:    No acute intracranial bleeding.  Chronic microvascular ischemic change.    < end of copied text >  < from: CT Abdomen and Pelvis w/ IV Cont (08.26.23 @ 02:17) >  IMPRESSION:  Large calculi within the bilateral renal pelves measuring up to 2.0 cm on   the right and 2.1 cm on the left with associated mild hydronephrosis.    Mild stranding surrounding the right greater than left renal pelvis and   proximal ureter. Correlate with urinalysis for superimposed infection.    Atherosclerotic changes with moderate narrowing of the left common iliac   artery and severe narrowing of the right internal iliac artery.    < end of copied text >      Radiology reports read and imaging reviewed  :  [ x] YES  [ ] NO  (I am not a radiologist and therefore rely on Radiologist reports to facilitate with diagnosis and treatment plans)    Consultant(s) Notes Reviewed:  [x ] YES  [ ] NO    Care Discussed with Consultants/Other Providers [x ] YES  [ ] NO  Care plan and all findings were discussed in detail with patient.  All questions and concerns addressed

## 2023-08-28 LAB
ALBUMIN SERPL ELPH-MCNC: 2.9 G/DL — LOW (ref 3.3–5)
ALP SERPL-CCNC: 48 U/L — SIGNIFICANT CHANGE UP (ref 40–120)
ALT FLD-CCNC: 19 U/L — SIGNIFICANT CHANGE UP (ref 12–78)
ANION GAP SERPL CALC-SCNC: 3 MMOL/L — LOW (ref 5–17)
AST SERPL-CCNC: 19 U/L — SIGNIFICANT CHANGE UP (ref 15–37)
BILIRUB SERPL-MCNC: 0.4 MG/DL — SIGNIFICANT CHANGE UP (ref 0.2–1.2)
BUN SERPL-MCNC: 8 MG/DL — SIGNIFICANT CHANGE UP (ref 7–23)
CALCIUM SERPL-MCNC: 8.6 MG/DL — SIGNIFICANT CHANGE UP (ref 8.5–10.1)
CHLORIDE SERPL-SCNC: 108 MMOL/L — SIGNIFICANT CHANGE UP (ref 96–108)
CO2 SERPL-SCNC: 30 MMOL/L — SIGNIFICANT CHANGE UP (ref 22–31)
CREAT SERPL-MCNC: 1.22 MG/DL — SIGNIFICANT CHANGE UP (ref 0.5–1.3)
EGFR: 71 ML/MIN/1.73M2 — SIGNIFICANT CHANGE UP
GLUCOSE SERPL-MCNC: 113 MG/DL — HIGH (ref 70–99)
HCT VFR BLD CALC: 32.3 % — LOW (ref 39–50)
HGB BLD-MCNC: 11.6 G/DL — LOW (ref 13–17)
MAGNESIUM SERPL-MCNC: 1.8 MG/DL — SIGNIFICANT CHANGE UP (ref 1.6–2.6)
MCHC RBC-ENTMCNC: 29.2 PG — SIGNIFICANT CHANGE UP (ref 27–34)
MCHC RBC-ENTMCNC: 35.9 G/DL — SIGNIFICANT CHANGE UP (ref 32–36)
MCV RBC AUTO: 81.4 FL — SIGNIFICANT CHANGE UP (ref 80–100)
NRBC # BLD: 0 /100 WBCS — SIGNIFICANT CHANGE UP (ref 0–0)
PHOSPHATE SERPL-MCNC: 2.8 MG/DL — SIGNIFICANT CHANGE UP (ref 2.5–4.5)
PLATELET # BLD AUTO: SIGNIFICANT CHANGE UP K/UL (ref 150–400)
POTASSIUM SERPL-MCNC: 3.7 MMOL/L — SIGNIFICANT CHANGE UP (ref 3.5–5.3)
POTASSIUM SERPL-SCNC: 3.7 MMOL/L — SIGNIFICANT CHANGE UP (ref 3.5–5.3)
PROT SERPL-MCNC: 6 GM/DL — SIGNIFICANT CHANGE UP (ref 6–8.3)
RBC # BLD: 3.97 M/UL — LOW (ref 4.2–5.8)
RBC # FLD: 12.7 % — SIGNIFICANT CHANGE UP (ref 10.3–14.5)
SODIUM SERPL-SCNC: 141 MMOL/L — SIGNIFICANT CHANGE UP (ref 135–145)
WBC # BLD: 7.2 K/UL — SIGNIFICANT CHANGE UP (ref 3.8–10.5)
WBC # FLD AUTO: 7.2 K/UL — SIGNIFICANT CHANGE UP (ref 3.8–10.5)

## 2023-08-28 PROCEDURE — 99232 SBSQ HOSP IP/OBS MODERATE 35: CPT

## 2023-08-28 PROCEDURE — 99233 SBSQ HOSP IP/OBS HIGH 50: CPT

## 2023-08-28 PROCEDURE — 99223 1ST HOSP IP/OBS HIGH 75: CPT

## 2023-08-28 RX ORDER — ACETAMINOPHEN 500 MG
650 TABLET ORAL EVERY 6 HOURS
Refills: 0 | Status: DISCONTINUED | OUTPATIENT
Start: 2023-08-28 | End: 2023-08-31

## 2023-08-28 RX ORDER — HYDROMORPHONE HYDROCHLORIDE 2 MG/ML
0.5 INJECTION INTRAMUSCULAR; INTRAVENOUS; SUBCUTANEOUS EVERY 4 HOURS
Refills: 0 | Status: DISCONTINUED | OUTPATIENT
Start: 2023-08-28 | End: 2023-08-31

## 2023-08-28 RX ORDER — OXYCODONE HYDROCHLORIDE 5 MG/1
5 TABLET ORAL EVERY 6 HOURS
Refills: 0 | Status: DISCONTINUED | OUTPATIENT
Start: 2023-08-28 | End: 2023-08-31

## 2023-08-28 RX ORDER — OXYCODONE HYDROCHLORIDE 5 MG/1
10 TABLET ORAL EVERY 6 HOURS
Refills: 0 | Status: DISCONTINUED | OUTPATIENT
Start: 2023-08-28 | End: 2023-08-31

## 2023-08-28 RX ADMIN — OXYCODONE HYDROCHLORIDE 10 MILLIGRAM(S): 5 TABLET ORAL at 17:16

## 2023-08-28 RX ADMIN — OXYCODONE HYDROCHLORIDE 10 MILLIGRAM(S): 5 TABLET ORAL at 09:51

## 2023-08-28 RX ADMIN — HEPARIN SODIUM 5000 UNIT(S): 5000 INJECTION INTRAVENOUS; SUBCUTANEOUS at 17:14

## 2023-08-28 RX ADMIN — SODIUM CHLORIDE 100 MILLILITER(S): 9 INJECTION INTRAMUSCULAR; INTRAVENOUS; SUBCUTANEOUS at 05:58

## 2023-08-28 RX ADMIN — PANTOPRAZOLE SODIUM 40 MILLIGRAM(S): 20 TABLET, DELAYED RELEASE ORAL at 12:02

## 2023-08-28 RX ADMIN — HEPARIN SODIUM 5000 UNIT(S): 5000 INJECTION INTRAVENOUS; SUBCUTANEOUS at 05:58

## 2023-08-28 RX ADMIN — CEFTRIAXONE 100 MILLIGRAM(S): 500 INJECTION, POWDER, FOR SOLUTION INTRAMUSCULAR; INTRAVENOUS at 05:59

## 2023-08-28 NOTE — PROGRESS NOTE ADULT - SUBJECTIVE AND OBJECTIVE BOX
PROGRESS NOTE:     Patient is a 53y old  Male who presents with a chief complaint of flank  pain (28 Aug 2023 09:26)          SUBJECTIVE & OBJECTIVE:   Pt seen and examined at bedside in AM    no overnight events.   no new complaints    REVIEW OF SYSTEMS: remaining ROS negative     PHYSICAL EXAM:  VITALS:  Vital Signs Last 24 Hrs  T(C): 36.6 (28 Aug 2023 10:24), Max: 36.6 (27 Aug 2023 17:15)  T(F): 97.9 (28 Aug 2023 10:24), Max: 97.9 (27 Aug 2023 17:15)  HR: 74 (28 Aug 2023 10:24) (69 - 74)  BP: 146/91 (28 Aug 2023 10:24) (113/70 - 146/91)  BP(mean): --  RR: 18 (28 Aug 2023 10:24) (18 - 18)  SpO2: 97% (28 Aug 2023 10:24) (97% - 99%)    Parameters below as of 28 Aug 2023 10:24  Patient On (Oxygen Delivery Method): room air          GENERAL: NAD,  no increased WOB  HEAD:  Atraumatic, Normocephalic  EYES: EOMI, PERRLA, conjunctiva and sclera clear  ENMT: Moist mucous membranes  NECK: Supple, No JVD  NERVOUS SYSTEM:  Alert & Oriented X3, no focal neuro deficits   CHEST/LUNG: Clear to auscultation bilaterally; No rales, rhonchi, wheezing, or rubs  HEART: Regular rate and rhythm; No murmurs, rubs, or gallops  ABDOMEN: Soft, Nontender, Nondistended; Bowel sounds present  EXTREMITIES:  2+ Peripheral Pulses b/l, No clubbing, cyanosis, calf tenderness or edema b/l      MEDICATIONS  (STANDING):  calcium carbonate 1250 mG  + Vitamin D (OsCal 500 + D) 1 Tablet(s) Oral daily  cefTRIAXone   IVPB 1000 milliGRAM(s) IV Intermittent every 24 hours  heparin   Injectable 5000 Unit(s) SubCutaneous every 12 hours  pantoprazole  Injectable 40 milliGRAM(s) IV Push daily  sodium chloride 0.9%. 1000 milliLiter(s) (100 mL/Hr) IV Continuous <Continuous>    MEDICATIONS  (PRN):  acetaminophen     Tablet .. 650 milliGRAM(s) Oral every 6 hours PRN Mild Pain (1 - 3)  HYDROmorphone  Injectable 0.5 milliGRAM(s) IV Push every 4 hours PRN breakthrough pain  ondansetron Injectable 4 milliGRAM(s) IV Push every 8 hours PRN Nausea and/or Vomiting  oxyCODONE    IR 5 milliGRAM(s) Oral every 6 hours PRN Moderate Pain (4 - 6)  oxyCODONE    IR 10 milliGRAM(s) Oral every 6 hours PRN Severe Pain (7 - 10)      Allergies    No Known Allergies    Intolerances              LABS:                           11.6   7.20  )-----------( CLUMPED    ( 28 Aug 2023 05:59 )             32.3     08-28    141  |  108  |  8   ----------------------------<  113<H>  3.7   |  30  |  1.22    Ca    8.6      28 Aug 2023 05:59  Phos  2.8     08-28  Mg     1.8     08-28    TPro  6.0  /  Alb  2.9<L>  /  TBili  0.4  /  DBili  x   /  AST  19  /  ALT  19  /  AlkPhos  48  08-28      Urinalysis Basic - ( 28 Aug 2023 05:59 )    Color: x / Appearance: x / SG: x / pH: x  Gluc: 113 mg/dL / Ketone: x  / Bili: x / Urobili: x   Blood: x / Protein: x / Nitrite: x   Leuk Esterase: x / RBC: x / WBC x   Sq Epi: x / Non Sq Epi: x / Bacteria: x      CAPILLARY BLOOD GLUCOSE          Culture - Urine (collected 26 Aug 2023 05:28)  Source: Clean Catch Clean Catch (Midstream)  Final Report (27 Aug 2023 07:53):    <10,000 CFU/mL Normal Urogenital Mini                RECENT CULTURES:          RADIOLOGY & ADDITIONAL TESTS:    < from: CT Head No Cont (08.26.23 @ 20:22) >  IMPRESSION:    No acute intracranial bleeding.  Chronic microvascular ischemic change.    < end of copied text >  < from: CT Abdomen and Pelvis w/ IV Cont (08.26.23 @ 02:17) >  IMPRESSION:  Large calculi within the bilateral renal pelves measuring up to 2.0 cm on   the right and 2.1 cm on the left with associated mild hydronephrosis.    Mild stranding surrounding the right greater than left renal pelvis and   proximal ureter. Correlate with urinalysis for superimposed infection.    Atherosclerotic changes with moderate narrowing of the left common iliac   artery and severe narrowing of the right internal iliac artery.    < end of copied text >    Radiology reports read and imaging reviewed  :  [ x] YES  [ ] NO  (I am not a radiologist and therefore rely on Radiologist reports to facilitate with diagnosis and treatment plans)    Consultant(s) Notes Reviewed:  [x ] YES  [ ] NO    Care Discussed with Consultants/Other Providers [x ] YES  [ ] NO  Care plan and all findings were discussed in detail with patient.  All questions and concerns addressed

## 2023-08-28 NOTE — CONSULT NOTE ADULT - NS ATTEND AMEND GEN_ALL_CORE FT
I have seen and reviewed the CT and arterial Duplex.No need for any arterial w/u or intervention for the right IIA stenosis.
I agree with the statements above.  Will continue ABX. Planning right URS lithotripsy trial on next Thursday.  Emergency placement of bilateral ureteral stents in case of worsening of patient conditions.
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
## 2023-08-28 NOTE — PROGRESS NOTE ADULT - ASSESSMENT
53  yr  M with   no  pmhx and  takes  no  meds c/o  b/l flank pain  for  past 4  months or  more. Denies   fevers/ chills/ cp/sob. Denies  rectal  bleed/  hematemesis          Pyelonephritis/Nephrolithiasis  -Present with b/l flank pain  -elevated wbc, elevated lactate, normalize after IVF  -UA positive for wbc, blood and LE  -CT A/P: Large calculi within the bilateral renal pelves measuring up to 2.0 cm on the right and 2.1 cm on the left with associated mild hydronephrosis  PLAN  -C/w Rocephin 1G  -C/w IVF  -Pain control  -F/u Ucx  -Urology consulted    PAD  - reports of intermittent numbness/pain in RLE  - Has not been to doctor for years  - CT A/P: Atherosclerotic changes with moderate narrowing of the left common iliac artery and severe narrowing of the right internal iliac artery  - TTE: Normal global left ventricular systolic function. No evidence of mitral valve regurgitation. Mild tricuspid regurgitation  - A1C: 7.2  - LDL: 69  PLAN  - Vascular consult
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
## 2023-08-29 LAB
ANION GAP SERPL CALC-SCNC: 1 MMOL/L — LOW (ref 5–17)
BUN SERPL-MCNC: 6 MG/DL — LOW (ref 7–23)
CALCIUM SERPL-MCNC: 8.6 MG/DL — SIGNIFICANT CHANGE UP (ref 8.5–10.1)
CHLORIDE SERPL-SCNC: 108 MMOL/L — SIGNIFICANT CHANGE UP (ref 96–108)
CO2 SERPL-SCNC: 33 MMOL/L — HIGH (ref 22–31)
CREAT SERPL-MCNC: 1.29 MG/DL — SIGNIFICANT CHANGE UP (ref 0.5–1.3)
EGFR: 67 ML/MIN/1.73M2 — SIGNIFICANT CHANGE UP
GLUCOSE SERPL-MCNC: 124 MG/DL — HIGH (ref 70–99)
HCT VFR BLD CALC: 31.6 % — LOW (ref 39–50)
HGB BLD-MCNC: 11.3 G/DL — LOW (ref 13–17)
MAGNESIUM SERPL-MCNC: 1.9 MG/DL — SIGNIFICANT CHANGE UP (ref 1.6–2.6)
MCHC RBC-ENTMCNC: 29.3 PG — SIGNIFICANT CHANGE UP (ref 27–34)
MCHC RBC-ENTMCNC: 35.8 G/DL — SIGNIFICANT CHANGE UP (ref 32–36)
MCV RBC AUTO: 81.9 FL — SIGNIFICANT CHANGE UP (ref 80–100)
NRBC # BLD: 0 /100 WBCS — SIGNIFICANT CHANGE UP (ref 0–0)
PHOSPHATE SERPL-MCNC: 3.3 MG/DL — SIGNIFICANT CHANGE UP (ref 2.5–4.5)
PLATELET # BLD AUTO: 196 K/UL — SIGNIFICANT CHANGE UP (ref 150–400)
POTASSIUM SERPL-MCNC: 3.7 MMOL/L — SIGNIFICANT CHANGE UP (ref 3.5–5.3)
POTASSIUM SERPL-SCNC: 3.7 MMOL/L — SIGNIFICANT CHANGE UP (ref 3.5–5.3)
RBC # BLD: 3.86 M/UL — LOW (ref 4.2–5.8)
RBC # FLD: 12.6 % — SIGNIFICANT CHANGE UP (ref 10.3–14.5)
SODIUM SERPL-SCNC: 142 MMOL/L — SIGNIFICANT CHANGE UP (ref 135–145)
WBC # BLD: 7.95 K/UL — SIGNIFICANT CHANGE UP (ref 3.8–10.5)
WBC # FLD AUTO: 7.95 K/UL — SIGNIFICANT CHANGE UP (ref 3.8–10.5)

## 2023-08-29 PROCEDURE — 99233 SBSQ HOSP IP/OBS HIGH 50: CPT

## 2023-08-29 RX ADMIN — Medication 1 TABLET(S): at 12:09

## 2023-08-29 RX ADMIN — OXYCODONE HYDROCHLORIDE 10 MILLIGRAM(S): 5 TABLET ORAL at 00:19

## 2023-08-29 RX ADMIN — OXYCODONE HYDROCHLORIDE 5 MILLIGRAM(S): 5 TABLET ORAL at 05:18

## 2023-08-29 RX ADMIN — OXYCODONE HYDROCHLORIDE 10 MILLIGRAM(S): 5 TABLET ORAL at 00:49

## 2023-08-29 RX ADMIN — SODIUM CHLORIDE 100 MILLILITER(S): 9 INJECTION INTRAMUSCULAR; INTRAVENOUS; SUBCUTANEOUS at 20:54

## 2023-08-29 RX ADMIN — OXYCODONE HYDROCHLORIDE 10 MILLIGRAM(S): 5 TABLET ORAL at 21:54

## 2023-08-29 RX ADMIN — SODIUM CHLORIDE 100 MILLILITER(S): 9 INJECTION INTRAMUSCULAR; INTRAVENOUS; SUBCUTANEOUS at 05:18

## 2023-08-29 RX ADMIN — PANTOPRAZOLE SODIUM 40 MILLIGRAM(S): 20 TABLET, DELAYED RELEASE ORAL at 12:09

## 2023-08-29 RX ADMIN — HEPARIN SODIUM 5000 UNIT(S): 5000 INJECTION INTRAVENOUS; SUBCUTANEOUS at 05:18

## 2023-08-29 RX ADMIN — OXYCODONE HYDROCHLORIDE 5 MILLIGRAM(S): 5 TABLET ORAL at 05:48

## 2023-08-29 RX ADMIN — OXYCODONE HYDROCHLORIDE 10 MILLIGRAM(S): 5 TABLET ORAL at 20:54

## 2023-08-29 RX ADMIN — CEFTRIAXONE 100 MILLIGRAM(S): 500 INJECTION, POWDER, FOR SOLUTION INTRAMUSCULAR; INTRAVENOUS at 05:20

## 2023-08-29 NOTE — PROGRESS NOTE ADULT - NS ATTEND AMEND GEN_ALL_CORE FT
Pt gets intermittent rt flank pain  No n/v/f/c  Voiding well  demonstrates rt flank tenderness  For OR Thurs.
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
## 2023-08-29 NOTE — PROGRESS NOTE ADULT - SUBJECTIVE AND OBJECTIVE BOX
PROGRESS NOTE:     Patient is a 53y old  Male who presents with a chief complaint of flank  pain (28 Aug 2023 17:13)          SUBJECTIVE & OBJECTIVE:   Pt seen and examined at bedside in AM    no overnight events.   no new complaints    REVIEW OF SYSTEMS: remaining ROS negative     PHYSICAL EXAM:  VITALS:  Vital Signs Last 24 Hrs  T(C): 37.2 (29 Aug 2023 05:04), Max: 37.2 (29 Aug 2023 05:04)  T(F): 98.9 (29 Aug 2023 05:04), Max: 98.9 (29 Aug 2023 05:04)  HR: 68 (29 Aug 2023 05:04) (68 - 70)  BP: 123/72 (29 Aug 2023 05:04) (123/72 - 145/95)  BP(mean): --  RR: 18 (29 Aug 2023 05:04) (18 - 18)  SpO2: 98% (29 Aug 2023 05:04) (96% - 99%)    Parameters below as of 29 Aug 2023 05:04  Patient On (Oxygen Delivery Method): room air          GENERAL: NAD,  no increased WOB  HEAD:  Atraumatic, Normocephalic  EYES: EOMI, PERRLA, conjunctiva and sclera clear  ENMT: Moist mucous membranes  NECK: Supple, No JVD  NERVOUS SYSTEM:  Alert & Oriented X3, no focal neuro deficits   CHEST/LUNG: Clear to auscultation bilaterally; No rales, rhonchi, wheezing, or rubs  HEART: Regular rate and rhythm  ABDOMEN: Soft, Nontender, Nondistended; Bowel sounds present, R flank pain  EXTREMITIES:  2+ Peripheral Pulses b/l, No clubbing, cyanosis, calf tenderness or edema b/l      MEDICATIONS  (STANDING):  calcium carbonate 1250 mG  + Vitamin D (OsCal 500 + D) 1 Tablet(s) Oral daily  cefTRIAXone   IVPB 1000 milliGRAM(s) IV Intermittent every 24 hours  heparin   Injectable 5000 Unit(s) SubCutaneous every 12 hours  pantoprazole  Injectable 40 milliGRAM(s) IV Push daily  sodium chloride 0.9%. 1000 milliLiter(s) (100 mL/Hr) IV Continuous <Continuous>    MEDICATIONS  (PRN):  acetaminophen     Tablet .. 650 milliGRAM(s) Oral every 6 hours PRN Mild Pain (1 - 3)  HYDROmorphone  Injectable 0.5 milliGRAM(s) IV Push every 4 hours PRN breakthrough pain  ondansetron Injectable 4 milliGRAM(s) IV Push every 8 hours PRN Nausea and/or Vomiting  oxyCODONE    IR 5 milliGRAM(s) Oral every 6 hours PRN Moderate Pain (4 - 6)  oxyCODONE    IR 10 milliGRAM(s) Oral every 6 hours PRN Severe Pain (7 - 10)      Allergies    No Known Allergies    Intolerances              LABS:                           11.3   7.95  )-----------( 196      ( 29 Aug 2023 06:52 )             31.6     08-29    142  |  108  |  6<L>  ----------------------------<  124<H>  3.7   |  33<H>  |  1.29    Ca    8.6      29 Aug 2023 06:52  Phos  3.3     08-29  Mg     1.9     08-29    TPro  6.0  /  Alb  2.9<L>  /  TBili  0.4  /  DBili  x   /  AST  19  /  ALT  19  /  AlkPhos  48  08-28      Urinalysis Basic - ( 29 Aug 2023 06:52 )    Color: x / Appearance: x / SG: x / pH: x  Gluc: 124 mg/dL / Ketone: x  / Bili: x / Urobili: x   Blood: x / Protein: x / Nitrite: x   Leuk Esterase: x / RBC: x / WBC x   Sq Epi: x / Non Sq Epi: x / Bacteria: x      CAPILLARY BLOOD GLUCOSE                      RECENT CULTURES:          RADIOLOGY & ADDITIONAL TESTS:    < from: CT Head No Cont (08.26.23 @ 20:22) >  IMPRESSION:    No acute intracranial bleeding.  Chronic microvascular ischemic change.    < end of copied text >  < from: CT Abdomen and Pelvis w/ IV Cont (08.26.23 @ 02:17) >  IMPRESSION:  Large calculi within the bilateral renal pelves measuring up to 2.0 cm on   the right and 2.1 cm on the left with associated mild hydronephrosis.    Mild stranding surrounding the right greater than left renal pelvis and   proximal ureter. Correlate with urinalysis for superimposed infection.    Atherosclerotic changes with moderate narrowing of the left common iliac   artery and severe narrowing of the right internal iliac artery.    < end of copied text >     < from: US Duplex Arterial Lower Ext Compl, Bilateral (08.27.23 @ 12:31) >  IMPRESSION:  *  No significant stenosis or occlusion in either leg.    < end of copied text >    Radiology reports read and imaging reviewed  :  [ x] YES  [ ] NO  (I am not a radiologist and therefore rely on Radiologist reports to facilitate with diagnosis and treatment plans)    Consultant(s) Notes Reviewed:  [x ] YES  [ ] NO    Care Discussed with Consultants/Other Providers [x ] YES  [ ] NO  Care plan and all findings were discussed in detail with patient.  All questions and concerns addressed

## 2023-08-29 NOTE — PROGRESS NOTE ADULT - ASSESSMENT
53  yr  M with   no  pmhx and  takes  no  meds c/o  b/l flank pain  for  past 4  months or  more. Denies   fevers/ chills/ cp/sob. Denies  rectal  bleed/  hematemesis     Pyelonephritis/Nephrolithiasis  -Present with b/l flank pain  -elevated wbc, elevated lactate, normalize after IVF  -UA positive for wbc, blood and LE  -CT A/P: Large calculi within the bilateral renal pelves measuring up to 2.0 cm on the right and 2.1 cm on the left with associated mild hydronephrosis  PLAN  -C/w Rocephin 1G  -Pain control  -F/u Ucx  -Urology consulted, plan for lithotripsy on thursday    PAD  - reports of intermittent numbness/pain in RLE  - Has not been to doctor for years  - CT A/P: Atherosclerotic changes with moderate narrowing of the left common iliac artery and severe narrowing of the right internal iliac artery  - Doppler LE: No significant stenosis or occlusion in either le  - TTE: Normal global left ventricular systolic function. No evidence of mitral valve regurgitation. Mild tricuspid regurgitation  - A1C: 6.2  - LDL: 69  PLAN  - Vascular consult, Recommend no vascular intervention or further workup at this time      Pre-diabetes  A1C 6.2  BS in low 100s  PLAN  - CTM

## 2023-08-30 LAB
ANION GAP SERPL CALC-SCNC: 5 MMOL/L — SIGNIFICANT CHANGE UP (ref 5–17)
APTT BLD: 31.3 SEC — SIGNIFICANT CHANGE UP (ref 24.5–35.6)
BLD GP AB SCN SERPL QL: SIGNIFICANT CHANGE UP
BUN SERPL-MCNC: 6 MG/DL — LOW (ref 7–23)
CALCIUM SERPL-MCNC: 8.6 MG/DL — SIGNIFICANT CHANGE UP (ref 8.5–10.1)
CHLORIDE SERPL-SCNC: 108 MMOL/L — SIGNIFICANT CHANGE UP (ref 96–108)
CO2 SERPL-SCNC: 29 MMOL/L — SIGNIFICANT CHANGE UP (ref 22–31)
CREAT SERPL-MCNC: 1.28 MG/DL — SIGNIFICANT CHANGE UP (ref 0.5–1.3)
EGFR: 67 ML/MIN/1.73M2 — SIGNIFICANT CHANGE UP
GLUCOSE SERPL-MCNC: 109 MG/DL — HIGH (ref 70–99)
HCT VFR BLD CALC: 30.6 % — LOW (ref 39–50)
HGB BLD-MCNC: 11.1 G/DL — LOW (ref 13–17)
INR BLD: 1.02 RATIO — SIGNIFICANT CHANGE UP (ref 0.85–1.18)
MCHC RBC-ENTMCNC: 29.4 PG — SIGNIFICANT CHANGE UP (ref 27–34)
MCHC RBC-ENTMCNC: 36.3 G/DL — HIGH (ref 32–36)
MCV RBC AUTO: 81 FL — SIGNIFICANT CHANGE UP (ref 80–100)
NRBC # BLD: 0 /100 WBCS — SIGNIFICANT CHANGE UP (ref 0–0)
PLATELET # BLD AUTO: 204 K/UL — SIGNIFICANT CHANGE UP (ref 150–400)
POTASSIUM SERPL-MCNC: 3.9 MMOL/L — SIGNIFICANT CHANGE UP (ref 3.5–5.3)
POTASSIUM SERPL-SCNC: 3.9 MMOL/L — SIGNIFICANT CHANGE UP (ref 3.5–5.3)
PROTHROM AB SERPL-ACNC: 12.1 SEC — SIGNIFICANT CHANGE UP (ref 9.5–13)
RBC # BLD: 3.78 M/UL — LOW (ref 4.2–5.8)
RBC # FLD: 12.8 % — SIGNIFICANT CHANGE UP (ref 10.3–14.5)
SODIUM SERPL-SCNC: 142 MMOL/L — SIGNIFICANT CHANGE UP (ref 135–145)
WBC # BLD: 5.98 K/UL — SIGNIFICANT CHANGE UP (ref 3.8–10.5)
WBC # FLD AUTO: 5.98 K/UL — SIGNIFICANT CHANGE UP (ref 3.8–10.5)

## 2023-08-30 PROCEDURE — 99232 SBSQ HOSP IP/OBS MODERATE 35: CPT

## 2023-08-30 RX ADMIN — HEPARIN SODIUM 5000 UNIT(S): 5000 INJECTION INTRAVENOUS; SUBCUTANEOUS at 17:09

## 2023-08-30 RX ADMIN — CEFTRIAXONE 100 MILLIGRAM(S): 500 INJECTION, POWDER, FOR SOLUTION INTRAMUSCULAR; INTRAVENOUS at 05:48

## 2023-08-30 RX ADMIN — Medication 1 TABLET(S): at 11:40

## 2023-08-30 RX ADMIN — SODIUM CHLORIDE 100 MILLILITER(S): 9 INJECTION INTRAMUSCULAR; INTRAVENOUS; SUBCUTANEOUS at 22:31

## 2023-08-30 RX ADMIN — PANTOPRAZOLE SODIUM 40 MILLIGRAM(S): 20 TABLET, DELAYED RELEASE ORAL at 11:40

## 2023-08-30 RX ADMIN — OXYCODONE HYDROCHLORIDE 5 MILLIGRAM(S): 5 TABLET ORAL at 06:48

## 2023-08-30 RX ADMIN — OXYCODONE HYDROCHLORIDE 5 MILLIGRAM(S): 5 TABLET ORAL at 05:48

## 2023-08-30 RX ADMIN — OXYCODONE HYDROCHLORIDE 5 MILLIGRAM(S): 5 TABLET ORAL at 21:42

## 2023-08-30 RX ADMIN — HEPARIN SODIUM 5000 UNIT(S): 5000 INJECTION INTRAVENOUS; SUBCUTANEOUS at 05:48

## 2023-08-30 RX ADMIN — OXYCODONE HYDROCHLORIDE 5 MILLIGRAM(S): 5 TABLET ORAL at 22:42

## 2023-08-30 NOTE — PROGRESS NOTE ADULT - ASSESSMENT
53  yr  M with   no  pmhx and  takes  no  meds c/o  b/l flank pain  for  past 4  months or  more. Denies   fevers/ chills/ cp/sob. Denies  rectal  bleed/  hematemesis     Pyelonephritis/Nephrolithiasis  -Present with b/l flank pain  -elevated wbc, elevated lactate, normalize after IVF  -UA positive for wbc, blood and LE  -CT A/P: Large calculi within the bilateral renal pelves measuring up to 2.0 cm on the right and 2.1 cm on the left with associated mild hydronephrosis    PLAN  -C/w Rocephin 1G  -Pain control  -Ucx- ngtd  -Urology consulted, plan for lithotripsy on Thursday    PAD  - per my colleague's documentation " reports of intermittent numbness/pain in RLE  - Has not been to doctor for years  - CT A/P: Atherosclerotic changes with moderate narrowing of the left common iliac artery and severe narrowing of the right internal iliac artery  - Doppler LE: No significant stenosis or occlusion in either le  - TTE: Normal global left ventricular systolic function. No evidence of mitral valve regurgitation. Mild tricuspid regurgitation  - A1C: 6.2  - LDL: 69"    8/30/2023 - vascular consult reviewed , Recommend no vascular intervention or further workup at this time      Pre-diabetes  A1C 6.2-- educated --will need f/u outp with pcp
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
## 2023-08-30 NOTE — PROGRESS NOTE ADULT - SUBJECTIVE AND OBJECTIVE BOX
PROGRESS NOTE:     Patient is a 53y old  Male who presents with a chief complaint of flank  pain (28 Aug 2023 17:13)          SUBJECTIVE & OBJECTIVE:   Pt seen and examined at bedside    no overnight events.   no new complaints    MEDICATIONS  (STANDING):  calcium carbonate 1250 mG  + Vitamin D (OsCal 500 + D) 1 Tablet(s) Oral daily  cefTRIAXone   IVPB 1000 milliGRAM(s) IV Intermittent every 24 hours  heparin   Injectable 5000 Unit(s) SubCutaneous every 12 hours  pantoprazole  Injectable 40 milliGRAM(s) IV Push daily  sodium chloride 0.9%. 1000 milliLiter(s) (100 mL/Hr) IV Continuous <Continuous>    MEDICATIONS  (PRN):  acetaminophen     Tablet .. 650 milliGRAM(s) Oral every 6 hours PRN Mild Pain (1 - 3)  HYDROmorphone  Injectable 0.5 milliGRAM(s) IV Push every 4 hours PRN breakthrough pain  ondansetron Injectable 4 milliGRAM(s) IV Push every 8 hours PRN Nausea and/or Vomiting  oxyCODONE    IR 5 milliGRAM(s) Oral every 6 hours PRN Moderate Pain (4 - 6)  oxyCODONE    IR 10 milliGRAM(s) Oral every 6 hours PRN Severe Pain (7 - 10)            GENERAL: NAD,  no increased WOB  HEAD:  Atraumatic, Normocephalic  EYES: EOMI, PERRLA, conjunctiva and sclera clear  ENMT: Moist mucous membranes  NECK: Supple, No JVD  NERVOUS SYSTEM:  Alert & Oriented X3, no focal neuro deficits   CHEST/LUNG: Clear to auscultation bilaterally; No rales, rhonchi, wheezing, or rubs  HEART: Regular rate and rhythm  ABDOMEN: Soft, Nontender, Nondistended; Bowel sounds present, R flank pain  EXTREMITIES:  2+ Peripheral Pulses b/l, No clubbing, cyanosis, calf tenderness or edema b/l        LABS:                                    11.1   5.98  )-----------( 204      ( 30 Aug 2023 05:58 )             30.6   08-30    142  |  108  |  6<L>  ----------------------------<  109<H>  3.9   |  29  |  1.28    Ca    8.6      30 Aug 2023 05:58  Phos  3.3     08-29  Mg     1.9     08-29        Urinalysis Basic - ( 29 Aug 2023 06:52 )    Color: x / Appearance: x / SG: x / pH: x  Gluc: 124 mg/dL / Ketone: x  / Bili: x / Urobili: x   Blood: x / Protein: x / Nitrite: x   Leuk Esterase: x / RBC: x / WBC x   Sq Epi: x / Non Sq Epi: x / Bacteria: x      CAPILLARY BLOOD GLUCOSE    CAPILLARY BLOOD GLUCOSE                        RECENT CULTURES:          RADIOLOGY & ADDITIONAL TESTS:    < from: CT Head No Cont (08.26.23 @ 20:22) >  IMPRESSION:    No acute intracranial bleeding.  Chronic microvascular ischemic change.    < end of copied text >  < from: CT Abdomen and Pelvis w/ IV Cont (08.26.23 @ 02:17) >  IMPRESSION:  Large calculi within the bilateral renal pelves measuring up to 2.0 cm on   the right and 2.1 cm on the left with associated mild hydronephrosis.    Mild stranding surrounding the right greater than left renal pelvis and   proximal ureter. Correlate with urinalysis for superimposed infection.    Atherosclerotic changes with moderate narrowing of the left common iliac   artery and severe narrowing of the right internal iliac artery.    < end of copied text >     < from: US Duplex Arterial Lower Ext Compl, Bilateral (08.27.23 @ 12:31) >  IMPRESSION:  *  No significant stenosis or occlusion in either leg.    < end of copied text >    Radiology reports read and imaging reviewed  :  [ x] YES  [ ] NO  (I am not a radiologist and therefore rely on Radiologist reports to facilitate with diagnosis and treatment plans)    Consultant(s) Notes Reviewed:  [x ] YES  [ ] NO    Care Discussed with Consultants/Other Providers [x ] YES  [ ] NO  Care plan and all findings were discussed in detail with patient.  All questions and concerns addressed

## 2023-08-31 LAB
ANION GAP SERPL CALC-SCNC: 4 MMOL/L — LOW (ref 5–17)
APTT BLD: 31.1 SEC — SIGNIFICANT CHANGE UP (ref 24.5–35.6)
BUN SERPL-MCNC: 6 MG/DL — LOW (ref 7–23)
CALCIUM SERPL-MCNC: 8.7 MG/DL — SIGNIFICANT CHANGE UP (ref 8.5–10.1)
CHLORIDE SERPL-SCNC: 108 MMOL/L — SIGNIFICANT CHANGE UP (ref 96–108)
CO2 SERPL-SCNC: 30 MMOL/L — SIGNIFICANT CHANGE UP (ref 22–31)
CREAT SERPL-MCNC: 1.28 MG/DL — SIGNIFICANT CHANGE UP (ref 0.5–1.3)
EGFR: 67 ML/MIN/1.73M2 — SIGNIFICANT CHANGE UP
GLUCOSE SERPL-MCNC: 116 MG/DL — HIGH (ref 70–99)
HCT VFR BLD CALC: 32.3 % — LOW (ref 39–50)
HGB BLD-MCNC: 11.2 G/DL — LOW (ref 13–17)
INR BLD: 1.03 RATIO — SIGNIFICANT CHANGE UP (ref 0.85–1.18)
MAGNESIUM SERPL-MCNC: 1.9 MG/DL — SIGNIFICANT CHANGE UP (ref 1.6–2.6)
MCHC RBC-ENTMCNC: 28.6 PG — SIGNIFICANT CHANGE UP (ref 27–34)
MCHC RBC-ENTMCNC: 34.7 G/DL — SIGNIFICANT CHANGE UP (ref 32–36)
MCV RBC AUTO: 82.6 FL — SIGNIFICANT CHANGE UP (ref 80–100)
NRBC # BLD: 0 /100 WBCS — SIGNIFICANT CHANGE UP (ref 0–0)
PHOSPHATE SERPL-MCNC: 3.2 MG/DL — SIGNIFICANT CHANGE UP (ref 2.5–4.5)
PLATELET # BLD AUTO: 231 K/UL — SIGNIFICANT CHANGE UP (ref 150–400)
POTASSIUM SERPL-MCNC: 3.6 MMOL/L — SIGNIFICANT CHANGE UP (ref 3.5–5.3)
POTASSIUM SERPL-SCNC: 3.6 MMOL/L — SIGNIFICANT CHANGE UP (ref 3.5–5.3)
PROTHROM AB SERPL-ACNC: 12.4 SEC — SIGNIFICANT CHANGE UP (ref 9.5–13)
RBC # BLD: 3.91 M/UL — LOW (ref 4.2–5.8)
RBC # FLD: 13 % — SIGNIFICANT CHANGE UP (ref 10.3–14.5)
SODIUM SERPL-SCNC: 142 MMOL/L — SIGNIFICANT CHANGE UP (ref 135–145)
WBC # BLD: 6.27 K/UL — SIGNIFICANT CHANGE UP (ref 3.8–10.5)
WBC # FLD AUTO: 6.27 K/UL — SIGNIFICANT CHANGE UP (ref 3.8–10.5)

## 2023-08-31 PROCEDURE — 51045 INCISE BLADDER/DRAIN URETER: CPT | Mod: LT

## 2023-08-31 PROCEDURE — 52325 CYSTOSCOPY STONE REMOVAL: CPT | Mod: RT

## 2023-08-31 PROCEDURE — 99232 SBSQ HOSP IP/OBS MODERATE 35: CPT

## 2023-08-31 DEVICE — STENT URET LUBRIFLX 6.0X26CM OPEN TIP: Type: IMPLANTABLE DEVICE | Site: BILATERAL | Status: FUNCTIONAL

## 2023-08-31 DEVICE — STONE BASKET ESCAPE NITINOL 4-WIRE 1.9FR X 90CM: Type: IMPLANTABLE DEVICE | Site: BILATERAL | Status: FUNCTIONAL

## 2023-08-31 DEVICE — STONE BASKET ZEROTIP NITINOL 4-WIRE 1.9FR 120CM X 12MM: Type: IMPLANTABLE DEVICE | Site: BILATERAL | Status: FUNCTIONAL

## 2023-08-31 DEVICE — URETERAL CATH OPEN END FLEXI-TIP 5FR .038" X 70CM: Type: IMPLANTABLE DEVICE | Site: BILATERAL | Status: FUNCTIONAL

## 2023-08-31 DEVICE — LASER FIBER SOLTIVE 365: Type: IMPLANTABLE DEVICE | Site: BILATERAL | Status: FUNCTIONAL

## 2023-08-31 DEVICE — GUIDEWIRE SENSOR DUAL-FLEX NITINOL ANGLED .035" X 150CM: Type: IMPLANTABLE DEVICE | Site: BILATERAL | Status: FUNCTIONAL

## 2023-08-31 RX ORDER — ONDANSETRON 8 MG/1
4 TABLET, FILM COATED ORAL EVERY 8 HOURS
Refills: 0 | Status: DISCONTINUED | OUTPATIENT
Start: 2023-08-31 | End: 2023-09-04

## 2023-08-31 RX ORDER — LANOLIN ALCOHOL/MO/W.PET/CERES
3 CREAM (GRAM) TOPICAL ONCE
Refills: 0 | Status: COMPLETED | OUTPATIENT
Start: 2023-08-31 | End: 2023-08-31

## 2023-08-31 RX ORDER — PANTOPRAZOLE SODIUM 20 MG/1
40 TABLET, DELAYED RELEASE ORAL DAILY
Refills: 0 | Status: DISCONTINUED | OUTPATIENT
Start: 2023-08-31 | End: 2023-09-04

## 2023-08-31 RX ORDER — HEPARIN SODIUM 5000 [USP'U]/ML
5000 INJECTION INTRAVENOUS; SUBCUTANEOUS EVERY 12 HOURS
Refills: 0 | Status: DISCONTINUED | OUTPATIENT
Start: 2023-08-31 | End: 2023-09-04

## 2023-08-31 RX ORDER — OXYCODONE HYDROCHLORIDE 5 MG/1
10 TABLET ORAL EVERY 6 HOURS
Refills: 0 | Status: DISCONTINUED | OUTPATIENT
Start: 2023-08-31 | End: 2023-08-31

## 2023-08-31 RX ORDER — CEFTRIAXONE 500 MG/1
1000 INJECTION, POWDER, FOR SOLUTION INTRAMUSCULAR; INTRAVENOUS EVERY 24 HOURS
Refills: 0 | Status: COMPLETED | OUTPATIENT
Start: 2023-09-01 | End: 2023-09-03

## 2023-08-31 RX ORDER — FENTANYL CITRATE 50 UG/ML
25 INJECTION INTRAVENOUS
Refills: 0 | Status: DISCONTINUED | OUTPATIENT
Start: 2023-08-31 | End: 2023-08-31

## 2023-08-31 RX ORDER — SODIUM CHLORIDE 9 MG/ML
1000 INJECTION INTRAMUSCULAR; INTRAVENOUS; SUBCUTANEOUS
Refills: 0 | Status: DISCONTINUED | OUTPATIENT
Start: 2023-08-31 | End: 2023-09-03

## 2023-08-31 RX ORDER — ACETAMINOPHEN 500 MG
650 TABLET ORAL EVERY 6 HOURS
Refills: 0 | Status: DISCONTINUED | OUTPATIENT
Start: 2023-08-31 | End: 2023-09-04

## 2023-08-31 RX ORDER — OXYCODONE HYDROCHLORIDE 5 MG/1
5 TABLET ORAL EVERY 6 HOURS
Refills: 0 | Status: DISCONTINUED | OUTPATIENT
Start: 2023-08-31 | End: 2023-09-04

## 2023-08-31 RX ORDER — HYDROMORPHONE HYDROCHLORIDE 2 MG/ML
1 INJECTION INTRAMUSCULAR; INTRAVENOUS; SUBCUTANEOUS EVERY 4 HOURS
Refills: 0 | Status: DISCONTINUED | OUTPATIENT
Start: 2023-08-31 | End: 2023-09-03

## 2023-08-31 RX ORDER — HYDROMORPHONE HYDROCHLORIDE 2 MG/ML
0.5 INJECTION INTRAMUSCULAR; INTRAVENOUS; SUBCUTANEOUS EVERY 4 HOURS
Refills: 0 | Status: DISCONTINUED | OUTPATIENT
Start: 2023-08-31 | End: 2023-08-31

## 2023-08-31 RX ADMIN — SODIUM CHLORIDE 100 MILLILITER(S): 9 INJECTION INTRAMUSCULAR; INTRAVENOUS; SUBCUTANEOUS at 13:40

## 2023-08-31 RX ADMIN — PANTOPRAZOLE SODIUM 40 MILLIGRAM(S): 20 TABLET, DELAYED RELEASE ORAL at 12:26

## 2023-08-31 RX ADMIN — OXYCODONE HYDROCHLORIDE 10 MILLIGRAM(S): 5 TABLET ORAL at 15:26

## 2023-08-31 RX ADMIN — HEPARIN SODIUM 5000 UNIT(S): 5000 INJECTION INTRAVENOUS; SUBCUTANEOUS at 05:32

## 2023-08-31 RX ADMIN — HYDROMORPHONE HYDROCHLORIDE 0.5 MILLIGRAM(S): 2 INJECTION INTRAMUSCULAR; INTRAVENOUS; SUBCUTANEOUS at 12:27

## 2023-08-31 RX ADMIN — ONDANSETRON 4 MILLIGRAM(S): 8 TABLET, FILM COATED ORAL at 20:17

## 2023-08-31 RX ADMIN — HEPARIN SODIUM 5000 UNIT(S): 5000 INJECTION INTRAVENOUS; SUBCUTANEOUS at 17:46

## 2023-08-31 RX ADMIN — HYDROMORPHONE HYDROCHLORIDE 1 MILLIGRAM(S): 2 INJECTION INTRAMUSCULAR; INTRAVENOUS; SUBCUTANEOUS at 20:17

## 2023-08-31 RX ADMIN — CEFTRIAXONE 100 MILLIGRAM(S): 500 INJECTION, POWDER, FOR SOLUTION INTRAMUSCULAR; INTRAVENOUS at 05:32

## 2023-08-31 RX ADMIN — SODIUM CHLORIDE 100 MILLILITER(S): 9 INJECTION INTRAMUSCULAR; INTRAVENOUS; SUBCUTANEOUS at 20:16

## 2023-08-31 RX ADMIN — OXYCODONE HYDROCHLORIDE 10 MILLIGRAM(S): 5 TABLET ORAL at 14:32

## 2023-08-31 RX ADMIN — HYDROMORPHONE HYDROCHLORIDE 0.5 MILLIGRAM(S): 2 INJECTION INTRAMUSCULAR; INTRAVENOUS; SUBCUTANEOUS at 13:39

## 2023-08-31 RX ADMIN — OXYCODONE HYDROCHLORIDE 5 MILLIGRAM(S): 5 TABLET ORAL at 05:32

## 2023-08-31 RX ADMIN — OXYCODONE HYDROCHLORIDE 5 MILLIGRAM(S): 5 TABLET ORAL at 06:32

## 2023-08-31 RX ADMIN — Medication 1 TABLET(S): at 12:27

## 2023-08-31 RX ADMIN — HYDROMORPHONE HYDROCHLORIDE 1 MILLIGRAM(S): 2 INJECTION INTRAMUSCULAR; INTRAVENOUS; SUBCUTANEOUS at 21:17

## 2023-08-31 RX ADMIN — Medication 3 MILLIGRAM(S): at 22:42

## 2023-08-31 NOTE — PROGRESS NOTE ADULT - SUBJECTIVE AND OBJECTIVE BOX
PROGRESS NOTE:     Patient is a 53y old  Male who presents with a chief complaint of flank  pain (28 Aug 2023 17:13)          SUBJECTIVE & OBJECTIVE:   Pt seen and examined at bedside    no overnight events.   no new complaints      MEDICATIONS  (STANDING):  calcium carbonate 1250 mG  + Vitamin D (OsCal 500 + D) 1 Tablet(s) Oral daily  cefTRIAXone   IVPB 1000 milliGRAM(s) IV Intermittent every 24 hours  heparin   Injectable 5000 Unit(s) SubCutaneous every 12 hours  pantoprazole  Injectable 40 milliGRAM(s) IV Push daily  sodium chloride 0.9%. 1000 milliLiter(s) (100 mL/Hr) IV Continuous <Continuous>    MEDICATIONS  (PRN):  acetaminophen     Tablet .. 650 milliGRAM(s) Oral every 6 hours PRN Mild Pain (1 - 3)  fentaNYL    Injectable 25 MICROGram(s) IV Push every 5 minutes PRN Moderate Pain (4 - 6)  HYDROmorphone  Injectable 0.5 milliGRAM(s) IV Push every 4 hours PRN breakthrough pain  ondansetron Injectable 4 milliGRAM(s) IV Push every 8 hours PRN Nausea and/or Vomiting  oxyCODONE    IR 10 milliGRAM(s) Oral every 6 hours PRN Severe Pain (7 - 10)  oxyCODONE    IR 5 milliGRAM(s) Oral every 6 hours PRN Moderate Pain (4 - 6)    Vital Signs Last 24 Hrs  T(C): 35.7 (31 Aug 2023 10:55), Max: 36.8 (31 Aug 2023 00:13)  T(F): 96.3 (31 Aug 2023 10:55), Max: 98.2 (31 Aug 2023 00:13)  HR: 86 (31 Aug 2023 11:25) (60 - 86)  BP: 121/87 (31 Aug 2023 11:25) (104/85 - 140/81)  BP(mean): --  RR: 12 (31 Aug 2023 11:25) (10 - 18)  SpO2: 100% (31 Aug 2023 11:25) (97% - 100%)    Parameters below as of 31 Aug 2023 11:25  Patient On (Oxygen Delivery Method): room air      GENERAL: NAD,  no increased WOB  HEAD:  Atraumatic, Normocephalic  EYES: EOMI, PERRLA, conjunctiva and sclera clear  ENMT: Moist mucous membranes  NECK: Supple, No JVD  NERVOUS SYSTEM:  Alert & Oriented X3, no focal neuro deficits   CHEST/LUNG: Clear to auscultation bilaterally; No rales, rhonchi, wheezing, or rubs  HEART: Regular rate and rhythm  ABDOMEN: Soft, Nontender, Nondistended; Bowel sounds present,   EXTREMITIES:  2+ Peripheral Pulses b/l, No clubbing, cyanosis, calf tenderness or edema b/l        LABS:                                                   11.2   6.27  )-----------( 231      ( 31 Aug 2023 07:05 )             32.3   08-31    142  |  108  |  6<L>  ----------------------------<  116<H>  3.6   |  30  |  1.28    Ca    8.7      31 Aug 2023 07:05  Phos  3.2     08-31  Mg     1.9     08-31            Urinalysis Basic - ( 29 Aug 2023 06:52 )    Color: x / Appearance: x / SG: x / pH: x  Gluc: 124 mg/dL / Ketone: x  / Bili: x / Urobili: x   Blood: x / Protein: x / Nitrite: x   Leuk Esterase: x / RBC: x / WBC x   Sq Epi: x / Non Sq Epi: x / Bacteria: x      CAPILLARY BLOOD GLUCOSE    CAPILLARY BLOOD GLUCOSE                        RECENT CULTURES:          RADIOLOGY & ADDITIONAL TESTS:    < from: CT Head No Cont (08.26.23 @ 20:22) >  IMPRESSION:    No acute intracranial bleeding.  Chronic microvascular ischemic change.    < end of copied text >  < from: CT Abdomen and Pelvis w/ IV Cont (08.26.23 @ 02:17) >  IMPRESSION:  Large calculi within the bilateral renal pelves measuring up to 2.0 cm on   the right and 2.1 cm on the left with associated mild hydronephrosis.    Mild stranding surrounding the right greater than left renal pelvis and   proximal ureter. Correlate with urinalysis for superimposed infection.    Atherosclerotic changes with moderate narrowing of the left common iliac   artery and severe narrowing of the right internal iliac artery.    < end of copied text >     < from: US Duplex Arterial Lower Ext Compl, Bilateral (08.27.23 @ 12:31) >  IMPRESSION:  *  No significant stenosis or occlusion in either leg.    < end of copied text >    Radiology reports read and imaging reviewed  :  [ x] YES  [ ] NO  (I am not a radiologist and therefore rely on Radiologist reports to facilitate with diagnosis and treatment plans)    Consultant(s) Notes Reviewed:  [x ] YES  [ ] NO    Care Discussed with Consultants/Other Providers [x ] YES  [ ] NO  Care plan and all findings were discussed in detail with patient.  All questions and concerns addressed

## 2023-08-31 NOTE — PROGRESS NOTE ADULT - SUBJECTIVE AND OBJECTIVE BOX
Post-op check    S/P POD#0  Pt seen and examined at bedside.   Admits to flank pain well controlled with medication.   Reports increased urinary frequency post-procedure. Only urinating small amounts.  Denies chest pain, shortness of breath, nausea/ vomiting, and dizziness.     Vital Signs Last 24 Hrs  T(F): 97.9 (08-31-23 @ 16:00), Max: 99 (08-31-23 @ 12:00)  HR: 85 (08-31-23 @ 16:00)  BP: 161/85 (08-31-23 @ 16:00)  RR: 17 (08-31-23 @ 16:00)  SpO2: 99% (08-31-23 @ 16:00)  Wt(kg): --   CAPILLARY BLOOD GLUCOSE      CONSTITUTIONAL: Alert, NAD  RESPIRATORY: Clear to auscultation bilaterally, respirations nonlabored  GASTROINTESTINAL: soft, nontender  : Bladder mildly distended, nontender. Bladder scan  post void reveal 400cc in bladder  MUSCULOSKELETAL: No calf tenderness, No edema

## 2023-08-31 NOTE — PROGRESS NOTE ADULT - SUBJECTIVE AND OBJECTIVE BOX
Pre-operative Note    - Pre-operative Diagnosis: bilateral renal stones    - Procedure: cystoscopy, bilateral ureteroscopy, possible lithotripsy possible bilateral ureteral stents     - Labs:                        11.1   5.98  )-----------( 204      ( 30 Aug 2023 05:58 )             30.6     08-30    142  |  108  |  6<L>  ----------------------------<  109<H>  3.9   |  29  |  1.28    Ca    8.6      30 Aug 2023 05:58  Phos  3.3     08-29  Mg     1.9     08-29      PT/INR - ( 30 Aug 2023 05:58 )   PT: 12.1 sec;   INR: 1.02 ratio         PTT - ( 30 Aug 2023 05:58 )  PTT:31.3 sec  Type & Screen #1: O POS  Type & Screen #2: O POS    - CXR:    - EKG:    - Blood: Not needed.     - Orders:  > NPO   > Morning Labs: CBC, BMP, coags, type & screen    - Permits:  > Consent in chart.  > Case scheduled with OR.

## 2023-08-31 NOTE — PRE-OP CHECKLIST - AS TEMP SITE
Patient comes to clinic for follow up anticoagulation visit.   Last POC  INR 5/25/18 was 1.8.  Dose increased per protocol.   Lab INR 6/1/18 = 2.3 in ED  Today's INR is 2.4 and is within goal range.    Current warfarin dosing verified with patient. Patient was informed that their INR result is within therapeutic range and instructed to maintain current dose per protocol. Discussed dose and return date for next INR.    Dr. Ambrosio is in the office today supervising the treatment. Note forwarded to physician for review.    Patient was instructed to contact the clinic with any unusual bleeding or bruising, any changes in medications, diet, health status, lifestyle, or any other changes, questions or concerns. Patient verbalized understanding of all discussed.      oral

## 2023-08-31 NOTE — PROGRESS NOTE ADULT - ASSESSMENT
53  yr  M with   no  pmhx and  takes  no  meds c/o  b/l flank pain  for  past 4  months or  more. Denies   fevers/ chills/ cp/sob. Denies  rectal  bleed/  hematemesis     Pyelonephritis/Nephrolithiasis  -Present with b/l flank pain  -elevated wbc, elevated lactate, normalize after IVF  -UA positive for wbc, blood and LE  -CT A/P: Large calculi within the bilateral renal pelves measuring up to 2.0 cm on the right and 2.1 cm on the left with associated mild hydronephrosis    PLAN  -C/w Rocephin 1G  -Pain control  -Ucx- ngtd  -Urology consulted, plan for lithotripsy on Thursday 8/31/2023 for lithotripsy today    PAD  - per my colleague's documentation " reports of intermittent numbness/pain in RLE  - Has not been to doctor for years  - CT A/P: Atherosclerotic changes with moderate narrowing of the left common iliac artery and severe narrowing of the right internal iliac artery  - Doppler LE: No significant stenosis or occlusion in either le  - TTE: Normal global left ventricular systolic function. No evidence of mitral valve regurgitation. Mild tricuspid regurgitation  - A1C: 6.2  - LDL: 69"    8/30/2023 - vascular consult reviewed , Recommend no vascular intervention or further workup at this time      Pre-diabetes  A1C 6.2-- educated --will need f/u outpt  with pcp
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
## 2023-09-01 LAB
ANION GAP SERPL CALC-SCNC: 7 MMOL/L — SIGNIFICANT CHANGE UP (ref 5–17)
BUN SERPL-MCNC: 15 MG/DL — SIGNIFICANT CHANGE UP (ref 7–23)
CALCIUM SERPL-MCNC: 9 MG/DL — SIGNIFICANT CHANGE UP (ref 8.5–10.1)
CHLORIDE SERPL-SCNC: 102 MMOL/L — SIGNIFICANT CHANGE UP (ref 96–108)
CO2 SERPL-SCNC: 28 MMOL/L — SIGNIFICANT CHANGE UP (ref 22–31)
CREAT SERPL-MCNC: 2.23 MG/DL — HIGH (ref 0.5–1.3)
EGFR: 35 ML/MIN/1.73M2 — LOW
GLUCOSE SERPL-MCNC: 127 MG/DL — HIGH (ref 70–99)
HCT VFR BLD CALC: 30.6 % — LOW (ref 39–50)
HGB BLD-MCNC: 10.7 G/DL — LOW (ref 13–17)
MAGNESIUM SERPL-MCNC: 1.9 MG/DL — SIGNIFICANT CHANGE UP (ref 1.6–2.6)
MCHC RBC-ENTMCNC: 28.8 PG — SIGNIFICANT CHANGE UP (ref 27–34)
MCHC RBC-ENTMCNC: 35 G/DL — SIGNIFICANT CHANGE UP (ref 32–36)
MCV RBC AUTO: 82.3 FL — SIGNIFICANT CHANGE UP (ref 80–100)
NRBC # BLD: 0 /100 WBCS — SIGNIFICANT CHANGE UP (ref 0–0)
PHOSPHATE SERPL-MCNC: 3.3 MG/DL — SIGNIFICANT CHANGE UP (ref 2.5–4.5)
PLATELET # BLD AUTO: 230 K/UL — SIGNIFICANT CHANGE UP (ref 150–400)
POTASSIUM SERPL-MCNC: 3.8 MMOL/L — SIGNIFICANT CHANGE UP (ref 3.5–5.3)
POTASSIUM SERPL-SCNC: 3.8 MMOL/L — SIGNIFICANT CHANGE UP (ref 3.5–5.3)
RBC # BLD: 3.72 M/UL — LOW (ref 4.2–5.8)
RBC # FLD: 13.1 % — SIGNIFICANT CHANGE UP (ref 10.3–14.5)
SODIUM SERPL-SCNC: 137 MMOL/L — SIGNIFICANT CHANGE UP (ref 135–145)
WBC # BLD: 15.15 K/UL — HIGH (ref 3.8–10.5)
WBC # FLD AUTO: 15.15 K/UL — HIGH (ref 3.8–10.5)

## 2023-09-01 PROCEDURE — 99232 SBSQ HOSP IP/OBS MODERATE 35: CPT

## 2023-09-01 RX ORDER — TAMSULOSIN HYDROCHLORIDE 0.4 MG/1
0.4 CAPSULE ORAL AT BEDTIME
Refills: 0 | Status: DISCONTINUED | OUTPATIENT
Start: 2023-09-01 | End: 2023-09-04

## 2023-09-01 RX ORDER — FINASTERIDE 5 MG/1
5 TABLET, FILM COATED ORAL DAILY
Refills: 0 | Status: DISCONTINUED | OUTPATIENT
Start: 2023-09-01 | End: 2023-09-04

## 2023-09-01 RX ADMIN — HEPARIN SODIUM 5000 UNIT(S): 5000 INJECTION INTRAVENOUS; SUBCUTANEOUS at 05:22

## 2023-09-01 RX ADMIN — HYDROMORPHONE HYDROCHLORIDE 1 MILLIGRAM(S): 2 INJECTION INTRAMUSCULAR; INTRAVENOUS; SUBCUTANEOUS at 06:22

## 2023-09-01 RX ADMIN — CEFTRIAXONE 100 MILLIGRAM(S): 500 INJECTION, POWDER, FOR SOLUTION INTRAMUSCULAR; INTRAVENOUS at 05:22

## 2023-09-01 RX ADMIN — HYDROMORPHONE HYDROCHLORIDE 1 MILLIGRAM(S): 2 INJECTION INTRAMUSCULAR; INTRAVENOUS; SUBCUTANEOUS at 05:22

## 2023-09-01 RX ADMIN — FINASTERIDE 5 MILLIGRAM(S): 5 TABLET, FILM COATED ORAL at 16:36

## 2023-09-01 RX ADMIN — PANTOPRAZOLE SODIUM 40 MILLIGRAM(S): 20 TABLET, DELAYED RELEASE ORAL at 11:58

## 2023-09-01 RX ADMIN — SODIUM CHLORIDE 100 MILLILITER(S): 9 INJECTION INTRAMUSCULAR; INTRAVENOUS; SUBCUTANEOUS at 06:20

## 2023-09-01 RX ADMIN — OXYCODONE HYDROCHLORIDE 5 MILLIGRAM(S): 5 TABLET ORAL at 11:39

## 2023-09-01 RX ADMIN — OXYCODONE HYDROCHLORIDE 5 MILLIGRAM(S): 5 TABLET ORAL at 10:39

## 2023-09-01 RX ADMIN — HYDROMORPHONE HYDROCHLORIDE 1 MILLIGRAM(S): 2 INJECTION INTRAMUSCULAR; INTRAVENOUS; SUBCUTANEOUS at 17:16

## 2023-09-01 RX ADMIN — HYDROMORPHONE HYDROCHLORIDE 1 MILLIGRAM(S): 2 INJECTION INTRAMUSCULAR; INTRAVENOUS; SUBCUTANEOUS at 18:16

## 2023-09-01 RX ADMIN — TAMSULOSIN HYDROCHLORIDE 0.4 MILLIGRAM(S): 0.4 CAPSULE ORAL at 21:23

## 2023-09-01 RX ADMIN — HEPARIN SODIUM 5000 UNIT(S): 5000 INJECTION INTRAVENOUS; SUBCUTANEOUS at 17:23

## 2023-09-01 RX ADMIN — SODIUM CHLORIDE 100 MILLILITER(S): 9 INJECTION INTRAMUSCULAR; INTRAVENOUS; SUBCUTANEOUS at 17:22

## 2023-09-01 NOTE — PROGRESS NOTE ADULT - ASSESSMENT
53  yr  M with   no  pmhx and  takes  no  meds c/o  b/l flank pain  for  past 4  months or  more. Denies   fevers/ chills/ cp/sob. Denies  rectal  bleed/  hematemesis     Pyelonephritis/Nephrolithiasis  -Present with b/l flank pain  -elevated wbc, elevated lactate, normalize after IVF  -UA positive for wbc, blood and LE  -CT A/P: Large calculi within the bilateral renal pelves measuring up to 2.0 cm on the right and 2.1 cm on the left with associated mild hydronephrosis    PLAN  -C/w Rocephin 1G  -Pain control  -Ucx- ngtd  -Urology consulted, plan for lithotripsy on Thursday 8/31/2023 for lithotripsy today  9/1/2023 s/p lithotripsy now with urinary retention, ennis placed and started on flomax per urology team .     PAD  - per my colleague's documentation " reports of intermittent numbness/pain in RLE  - Has not been to doctor for years  - CT A/P: Atherosclerotic changes with moderate narrowing of the left common iliac artery and severe narrowing of the right internal iliac artery  - Doppler LE: No significant stenosis or occlusion in either le  - TTE: Normal global left ventricular systolic function. No evidence of mitral valve regurgitation. Mild tricuspid regurgitation  - A1C: 6.2  - LDL: 69"    8/30/2023 - vascular consult reviewed , Recommend no vascular intervention or further workup at this time      Pre-diabetes  A1C 6.2-- educated --will need f/u outpt  with pcp

## 2023-09-01 NOTE — PROGRESS NOTE ADULT - SUBJECTIVE AND OBJECTIVE BOX
SURGERY PROGRESS HPI:  Pt seen and examined at bedside.  Pt is c/o difficulty voiding.      Vital Signs Last 24 Hrs  T(C): 36.9 (01 Sep 2023 11:23), Max: 37.5 (01 Sep 2023 00:16)  T(F): 98.4 (01 Sep 2023 11:23), Max: 99.5 (01 Sep 2023 00:16)  HR: 93 (01 Sep 2023 11:23) (74 - 102)  BP: 143/84 (01 Sep 2023 11:23) (143/79 - 170/92)  BP(mean): --  RR: 18 (01 Sep 2023 11:23) (16 - 18)  SpO2: 98% (01 Sep 2023 11:23) (97% - 99%)    Parameters below as of 01 Sep 2023 11:23  Patient On (Oxygen Delivery Method): room air          PHYSICAL EXAM:    GENERAL: NAD  HEAD:  Atraumatic, Normocephalic  CHEST/LUNG: normal work of breathing  HEART: RRR   ABDOMEN: non distended, soft, non tender, no guarding  EXTREMITIES:  calf soft, non tender b/l    I&O's Detail    31 Aug 2023 07:01  -  01 Sep 2023 07:00  --------------------------------------------------------  IN:    IV PiggyBack: 50 mL    sodium chloride 0.9%: 1200 mL  Total IN: 1250 mL    OUT:    Voided (mL): 1000 mL  Total OUT: 1000 mL    Total NET: 250 mL          LABS:                        10.7   15.15 )-----------( 230      ( 01 Sep 2023 06:55 )             30.6     09-01    137  |  102  |  15  ----------------------------<  127<H>  3.8   |  28  |  2.23<H>    Ca    9.0      01 Sep 2023 06:55  Phos  3.3     09-01  Mg     1.9     09-01      PT/INR - ( 31 Aug 2023 07:05 )   PT: 12.4 sec;   INR: 1.03 ratio         PTT - ( 31 Aug 2023 07:05 )  PTT:31.1 sec  Urinalysis Basic - ( 01 Sep 2023 06:55 )    Color: x / Appearance: x / SG: x / pH: x  Gluc: 127 mg/dL / Ketone: x  / Bili: x / Urobili: x   Blood: x / Protein: x / Nitrite: x   Leuk Esterase: x / RBC: x / WBC x   Sq Epi: x / Non Sq Epi: x / Bacteria: x
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
## 2023-09-01 NOTE — PROGRESS NOTE ADULT - SUBJECTIVE AND OBJECTIVE BOX
PROGRESS NOTE:     Patient is a 53y old  Male who presents with a chief complaint of flank  pain (28 Aug 2023 17:13)          SUBJECTIVE & OBJECTIVE:   Pt seen and examined at bedside     c/o urinary retention earlier now with a ennis per urology team       MEDICATIONS  (STANDING):  cefTRIAXone   IVPB 1000 milliGRAM(s) IV Intermittent every 24 hours  heparin   Injectable 5000 Unit(s) SubCutaneous every 12 hours  pantoprazole  Injectable 40 milliGRAM(s) IV Push daily  sodium chloride 0.9%. 1000 milliLiter(s) (100 mL/Hr) IV Continuous <Continuous>  tamsulosin 0.4 milliGRAM(s) Oral at bedtime    MEDICATIONS  (PRN):  acetaminophen     Tablet .. 650 milliGRAM(s) Oral every 6 hours PRN Mild Pain (1 - 3)  HYDROmorphone  Injectable 1 milliGRAM(s) IV Push every 4 hours PRN Severe Pain (7 - 10)  ondansetron Injectable 4 milliGRAM(s) IV Push every 8 hours PRN Nausea and/or Vomiting  oxyCODONE    IR 5 milliGRAM(s) Oral every 6 hours PRN Moderate Pain (4 - 6)      Vital Signs Last 24 Hrs  T(C): 35.7 (31 Aug 2023 10:55), Max: 36.8 (31 Aug 2023 00:13)  T(F): 96.3 (31 Aug 2023 10:55), Max: 98.2 (31 Aug 2023 00:13)  HR: 86 (31 Aug 2023 11:25) (60 - 86)  BP: 121/87 (31 Aug 2023 11:25) (104/85 - 140/81)  BP(mean): --  RR: 12 (31 Aug 2023 11:25) (10 - 18)  SpO2: 100% (31 Aug 2023 11:25) (97% - 100%)    Parameters below as of 31 Aug 2023 11:25  Patient On (Oxygen Delivery Method): room air      GENERAL: NAD,  no increased WOB  HEAD:  Atraumatic, Normocephalic  EYES: EOMI, PERRLA, conjunctiva and sclera clear  ENMT: Moist mucous membranes  NECK: Supple, No JVD  NERVOUS SYSTEM:  Alert & Oriented X3, no focal neuro deficits   CHEST/LUNG: Clear to auscultation bilaterally; No rales, rhonchi, wheezing, or rubs  HEART: Regular rate and rhythm  ABDOMEN: Soft, Nontender, Nondistended; Bowel sounds present,   EXTREMITIES:  2+ Peripheral Pulses b/l, No clubbing, cyanosis, calf tenderness or edema b/l        LABS:                                                   11.2   6.27  )-----------( 231      ( 31 Aug 2023 07:05 )             32.3   08-31    142  |  108  |  6<L>  ----------------------------<  116<H>  3.6   |  30  |  1.28    Ca    8.7      31 Aug 2023 07:05  Phos  3.2     08-31  Mg     1.9     08-31            Urinalysis Basic - ( 29 Aug 2023 06:52 )    Color: x / Appearance: x / SG: x / pH: x  Gluc: 124 mg/dL / Ketone: x  / Bili: x / Urobili: x   Blood: x / Protein: x / Nitrite: x   Leuk Esterase: x / RBC: x / WBC x   Sq Epi: x / Non Sq Epi: x / Bacteria: x      CAPILLARY BLOOD GLUCOSE    CAPILLARY BLOOD GLUCOSE                        RECENT CULTURES:          RADIOLOGY & ADDITIONAL TESTS:    < from: CT Head No Cont (08.26.23 @ 20:22) >  IMPRESSION:    No acute intracranial bleeding.  Chronic microvascular ischemic change.    < end of copied text >  < from: CT Abdomen and Pelvis w/ IV Cont (08.26.23 @ 02:17) >  IMPRESSION:  Large calculi within the bilateral renal pelves measuring up to 2.0 cm on   the right and 2.1 cm on the left with associated mild hydronephrosis.    Mild stranding surrounding the right greater than left renal pelvis and   proximal ureter. Correlate with urinalysis for superimposed infection.    Atherosclerotic changes with moderate narrowing of the left common iliac   artery and severe narrowing of the right internal iliac artery.    < end of copied text >     < from: US Duplex Arterial Lower Ext Compl, Bilateral (08.27.23 @ 12:31) >  IMPRESSION:  *  No significant stenosis or occlusion in either leg.    < end of copied text >    Radiology reports read and imaging reviewed  :  [ x] YES  [ ] NO  (I am not a radiologist and therefore rely on Radiologist reports to facilitate with diagnosis and treatment plans)    Consultant(s) Notes Reviewed:  [x ] YES  [ ] NO    Care Discussed with Consultants/Other Providers [x ] YES  [ ] NO  Care plan and all findings were discussed in detail with patient.  All questions and concerns addressed

## 2023-09-02 LAB
ANION GAP SERPL CALC-SCNC: 6 MMOL/L — SIGNIFICANT CHANGE UP (ref 5–17)
BUN SERPL-MCNC: 8 MG/DL — SIGNIFICANT CHANGE UP (ref 7–23)
CALCIUM SERPL-MCNC: 8.4 MG/DL — LOW (ref 8.5–10.1)
CHLORIDE SERPL-SCNC: 104 MMOL/L — SIGNIFICANT CHANGE UP (ref 96–108)
CO2 SERPL-SCNC: 31 MMOL/L — SIGNIFICANT CHANGE UP (ref 22–31)
CREAT SERPL-MCNC: 1.35 MG/DL — HIGH (ref 0.5–1.3)
EGFR: 63 ML/MIN/1.73M2 — SIGNIFICANT CHANGE UP
GLUCOSE SERPL-MCNC: 120 MG/DL — HIGH (ref 70–99)
HCT VFR BLD CALC: 28.6 % — LOW (ref 39–50)
HGB BLD-MCNC: 10 G/DL — LOW (ref 13–17)
MAGNESIUM SERPL-MCNC: 1.7 MG/DL — SIGNIFICANT CHANGE UP (ref 1.6–2.6)
MCHC RBC-ENTMCNC: 29.2 PG — SIGNIFICANT CHANGE UP (ref 27–34)
MCHC RBC-ENTMCNC: 35 G/DL — SIGNIFICANT CHANGE UP (ref 32–36)
MCV RBC AUTO: 83.4 FL — SIGNIFICANT CHANGE UP (ref 80–100)
NRBC # BLD: 0 /100 WBCS — SIGNIFICANT CHANGE UP (ref 0–0)
PHOSPHATE SERPL-MCNC: 2.8 MG/DL — SIGNIFICANT CHANGE UP (ref 2.5–4.5)
PLATELET # BLD AUTO: 228 K/UL — SIGNIFICANT CHANGE UP (ref 150–400)
POTASSIUM SERPL-MCNC: 3.7 MMOL/L — SIGNIFICANT CHANGE UP (ref 3.5–5.3)
POTASSIUM SERPL-SCNC: 3.7 MMOL/L — SIGNIFICANT CHANGE UP (ref 3.5–5.3)
RBC # BLD: 3.43 M/UL — LOW (ref 4.2–5.8)
RBC # FLD: 13.4 % — SIGNIFICANT CHANGE UP (ref 10.3–14.5)
SODIUM SERPL-SCNC: 141 MMOL/L — SIGNIFICANT CHANGE UP (ref 135–145)
WBC # BLD: 9.39 K/UL — SIGNIFICANT CHANGE UP (ref 3.8–10.5)
WBC # FLD AUTO: 9.39 K/UL — SIGNIFICANT CHANGE UP (ref 3.8–10.5)

## 2023-09-02 PROCEDURE — 99232 SBSQ HOSP IP/OBS MODERATE 35: CPT

## 2023-09-02 RX ADMIN — Medication 650 MILLIGRAM(S): at 21:23

## 2023-09-02 RX ADMIN — HYDROMORPHONE HYDROCHLORIDE 1 MILLIGRAM(S): 2 INJECTION INTRAMUSCULAR; INTRAVENOUS; SUBCUTANEOUS at 12:07

## 2023-09-02 RX ADMIN — CEFTRIAXONE 100 MILLIGRAM(S): 500 INJECTION, POWDER, FOR SOLUTION INTRAMUSCULAR; INTRAVENOUS at 05:45

## 2023-09-02 RX ADMIN — HYDROMORPHONE HYDROCHLORIDE 1 MILLIGRAM(S): 2 INJECTION INTRAMUSCULAR; INTRAVENOUS; SUBCUTANEOUS at 00:43

## 2023-09-02 RX ADMIN — PANTOPRAZOLE SODIUM 40 MILLIGRAM(S): 20 TABLET, DELAYED RELEASE ORAL at 13:32

## 2023-09-02 RX ADMIN — Medication 650 MILLIGRAM(S): at 22:26

## 2023-09-02 RX ADMIN — HYDROMORPHONE HYDROCHLORIDE 1 MILLIGRAM(S): 2 INJECTION INTRAMUSCULAR; INTRAVENOUS; SUBCUTANEOUS at 05:45

## 2023-09-02 RX ADMIN — ONDANSETRON 4 MILLIGRAM(S): 8 TABLET, FILM COATED ORAL at 17:10

## 2023-09-02 RX ADMIN — HYDROMORPHONE HYDROCHLORIDE 1 MILLIGRAM(S): 2 INJECTION INTRAMUSCULAR; INTRAVENOUS; SUBCUTANEOUS at 06:00

## 2023-09-02 RX ADMIN — SODIUM CHLORIDE 100 MILLILITER(S): 9 INJECTION INTRAMUSCULAR; INTRAVENOUS; SUBCUTANEOUS at 05:59

## 2023-09-02 RX ADMIN — SODIUM CHLORIDE 100 MILLILITER(S): 9 INJECTION INTRAMUSCULAR; INTRAVENOUS; SUBCUTANEOUS at 09:52

## 2023-09-02 RX ADMIN — HEPARIN SODIUM 5000 UNIT(S): 5000 INJECTION INTRAVENOUS; SUBCUTANEOUS at 05:59

## 2023-09-02 RX ADMIN — SODIUM CHLORIDE 100 MILLILITER(S): 9 INJECTION INTRAMUSCULAR; INTRAVENOUS; SUBCUTANEOUS at 19:56

## 2023-09-02 RX ADMIN — TAMSULOSIN HYDROCHLORIDE 0.4 MILLIGRAM(S): 0.4 CAPSULE ORAL at 21:22

## 2023-09-02 RX ADMIN — FINASTERIDE 5 MILLIGRAM(S): 5 TABLET, FILM COATED ORAL at 12:32

## 2023-09-02 RX ADMIN — HEPARIN SODIUM 5000 UNIT(S): 5000 INJECTION INTRAVENOUS; SUBCUTANEOUS at 19:02

## 2023-09-02 RX ADMIN — HYDROMORPHONE HYDROCHLORIDE 1 MILLIGRAM(S): 2 INJECTION INTRAMUSCULAR; INTRAVENOUS; SUBCUTANEOUS at 00:58

## 2023-09-02 NOTE — PROGRESS NOTE ADULT - ASSESSMENT
53  yr  M with   no  pmhx and  takes  no  meds c/o  b/l flank pain  for  past 4  months or  more. Denies   fevers/ chills/ cp/sob. Denies  rectal  bleed/  hematemesis     Pyelonephritis/Nephrolithiasis  -Present with b/l flank pain  -elevated wbc, elevated lactate, normalize after IVF  -UA positive for wbc, blood and LE  -CT A/P: Large calculi within the bilateral renal pelves measuring up to 2.0 cm on the right and 2.1 cm on the left with associated mild hydronephrosis    PLAN  -C/w Rocephin 1G  -Pain control  -Ucx- ngtd  -Urology consulted, plan for lithotripsy on Thursday 8/31/2023 for lithotripsy today    9/1/2023 s/p lithotripsy now with urinary retention, ennis placed and started on flomax per urology team .   9/2/2023 TOV today     PAD  - per my colleague's documentation " reports of intermittent numbness/pain in RLE  - Has not been to doctor for years  - CT A/P: Atherosclerotic changes with moderate narrowing of the left common iliac artery and severe narrowing of the right internal iliac artery  - Doppler LE: No significant stenosis or occlusion in either le  - TTE: Normal global left ventricular systolic function. No evidence of mitral valve regurgitation. Mild tricuspid regurgitation  - A1C: 6.2  - LDL: 69"    8/30/2023 - vascular consult reviewed , Recommend no vascular intervention or further workup at this time      Pre-diabetes  A1C 6.2-- educated --will need f/u outpt  with pcp         53  yr  M with   no  pmhx and  takes  no  meds c/o  b/l flank pain  for  past 4  months or  more. Denies   fevers/ chills/ cp/sob. Denies  rectal  bleed/  hematemesis     Pyelonephritis/Nephrolithiasis  -Present with b/l flank pain  -elevated wbc, elevated lactate, normalize after IVF  -UA positive for wbc, blood and LE  -CT A/P: Large calculi within the bilateral renal pelves measuring up to 2.0 cm on the right and 2.1 cm on the left with associated mild hydronephrosis    PLAN  -C/w Rocephin 1G  -Pain control  -Ucx- ngtd  -Urology consulted, plan for lithotripsy on Thursday 8/31/2023 for lithotripsy today    9/1/2023 s/p lithotripsy now with urinary retention, ennis placed and started on flomax per urology team .   9/2/2023 TOV today     PAD  - per my colleague's documentation " reports of intermittent numbness/pain in RLE  - Has not been to doctor for years  - CT A/P: Atherosclerotic changes with moderate narrowing of the left common iliac artery and severe narrowing of the right internal iliac artery  - Doppler LE: No significant stenosis or occlusion in either le  - TTE: Normal global left ventricular systolic function. No evidence of mitral valve regurgitation. Mild tricuspid regurgitation  - A1C: 6.2  - LDL: 69"    8/30/2023 - vascular consult reviewed , Recommend no vascular intervention or further workup at this time   chronic intermittent low back pain- with intermittent numbness-  for >1 year ,  advised to f/u ortho spine - will [facility]. 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       Pre-diabetes  A1C 6.2-- educated --will need f/u outpt  with pcp

## 2023-09-02 NOTE — PROGRESS NOTE ADULT - SUBJECTIVE AND OBJECTIVE BOX
Postoperative Day #: 2  Patient seen and examined bedside resting comfortably.  denies abd pain. passed TOV. + hematuria  Denies nausea, vomiting, diarrhea, fevers, chills. Tolerating diet.        T(F): 99.6 (09-02-23 @ 11:38), Max: 99.6 (09-02-23 @ 11:38)  HR: 100 (09-02-23 @ 11:38) (92 - 100)  BP: 124/72 (09-02-23 @ 11:38) (98/64 - 128/78)  RR: 18 (09-02-23 @ 11:38) (17 - 18)  SpO2: 98% (09-02-23 @ 11:38) (97% - 99%)  Wt(kg): --  CAPILLARY BLOOD GLUCOSE          PHYSICAL EXAM:  General: NAD  Neuro:  Alert & oriented x 3  HEENT: NCAT, EOMI, conjunctiva clear  CV: +S1+S2 regular rate and rhythm  Lung:respirations nonlabored, good inspiratory effort  Abdomen: soft, NTND.  : suprapubic nontender, noted gross hematuria in urinal at bedside   Extremities: no pedal edema or calf tenderness noted     LABS:                        10.0   9.39  )-----------( 228      ( 02 Sep 2023 14:10 )             28.6     09-02    141  |  104  |  8   ----------------------------<  120<H>  3.7   |  31  |  1.35<H>    Ca    8.4<L>      02 Sep 2023 14:10  Phos  2.8     09-02  Mg     1.7     09-02          A/P: 53M s/p cystoscopy, uteroscopy lithotripsy with bilateral ureteral stents. Diaz removed, passed TOV  -continue with flomax/finasteride  -can follow up outpt with Dr Medina in 1- 2 weeks   -continue care as per medicine team  Postoperative Day #: 2  Patient seen and examined bedside resting comfortably.  denies abd pain, urinating + hematuria  Denies nausea, vomiting, diarrhea, fevers, chills. Tolerating diet.        T(F): 99.6 (09-02-23 @ 11:38), Max: 99.6 (09-02-23 @ 11:38)  HR: 100 (09-02-23 @ 11:38) (92 - 100)  BP: 124/72 (09-02-23 @ 11:38) (98/64 - 128/78)  RR: 18 (09-02-23 @ 11:38) (17 - 18)  SpO2: 98% (09-02-23 @ 11:38) (97% - 99%)  Wt(kg): --  CAPILLARY BLOOD GLUCOSE          PHYSICAL EXAM:  General: NAD  Neuro:  Alert & oriented x 3  HEENT: NCAT, EOMI, conjunctiva clear  CV: +S1+S2 regular rate and rhythm  Lung:respirations nonlabored, good inspiratory effort  Abdomen: soft, NTND.  : suprapubic nontender, noted gross hematuria in urinal at bedside   Extremities: no pedal edema or calf tenderness noted     LABS:                        10.0   9.39  )-----------( 228      ( 02 Sep 2023 14:10 )             28.6     09-02    141  |  104  |  8   ----------------------------<  120<H>  3.7   |  31  |  1.35<H>    Ca    8.4<L>      02 Sep 2023 14:10  Phos  2.8     09-02  Mg     1.7     09-02          A/P: 53M s/p cystoscopy, uteroscopy lithotripsy with bilateral ureteral stents. Diaz removed, with gross hematuria, , 243, 244.  -follow up another PVR  -continue with flomax/finasteride  -can follow up outpt with Dr Medina in 1- 2 weeks   -continue care as per medicine team

## 2023-09-02 NOTE — PROGRESS NOTE ADULT - SUBJECTIVE AND OBJECTIVE BOX
PROGRESS NOTE:     Patient is a 53y old  Male who presents with a chief complaint of flank  pain (28 Aug 2023 17:13)          SUBJECTIVE & OBJECTIVE:   none   TODAY -- TOV      MEDICATIONS  (STANDING):  cefTRIAXone   IVPB 1000 milliGRAM(s) IV Intermittent every 24 hours  finasteride 5 milliGRAM(s) Oral daily  heparin   Injectable 5000 Unit(s) SubCutaneous every 12 hours  pantoprazole  Injectable 40 milliGRAM(s) IV Push daily  sodium chloride 0.9%. 1000 milliLiter(s) (100 mL/Hr) IV Continuous <Continuous>  tamsulosin 0.4 milliGRAM(s) Oral at bedtime    MEDICATIONS  (PRN):  acetaminophen     Tablet .. 650 milliGRAM(s) Oral every 6 hours PRN Mild Pain (1 - 3)  HYDROmorphone  Injectable 1 milliGRAM(s) IV Push every 4 hours PRN Severe Pain (7 - 10)  ondansetron Injectable 4 milliGRAM(s) IV Push every 8 hours PRN Nausea and/or Vomiting  oxyCODONE    IR 5 milliGRAM(s) Oral every 6 hours PRN Moderate Pain (4 - 6)    Vital Signs Last 24 Hrs  T(C): 37.6 (02 Sep 2023 11:38), Max: 37.6 (02 Sep 2023 11:38)  T(F): 99.6 (02 Sep 2023 11:38), Max: 99.6 (02 Sep 2023 11:38)  HR: 100 (02 Sep 2023 11:38) (92 - 100)  BP: 124/72 (02 Sep 2023 11:38) (98/64 - 128/78)  BP(mean): --  RR: 18 (02 Sep 2023 11:38) (17 - 18)  SpO2: 98% (02 Sep 2023 11:38) (97% - 99%)    Parameters below as of 02 Sep 2023 11:38  Patient On (Oxygen Delivery Method): room air          GENERAL: NAD,  no increased WOB  HEAD:  Atraumatic, Normocephalic  EYES: EOMI, PERRLA, conjunctiva and sclera clear  ENMT: Moist mucous membranes  NECK: Supple, No JVD  NERVOUS SYSTEM:  Alert & Oriented X3, no focal neuro deficits   CHEST/LUNG: Clear to auscultation bilaterally; No rales, rhonchi, wheezing, or rubs  HEART: Regular rate and rhythm  ABDOMEN: Soft, Nontender, Nondistended; Bowel sounds present,   EXTREMITIES:  2+ Peripheral Pulses b/l, No clubbing, cyanosis, calf tenderness or edema b/l        LABS:                                     RECENT CULTURES:          RADIOLOGY & ADDITIONAL TESTS:    < from: CT Head No Cont (08.26.23 @ 20:22) >  IMPRESSION:    No acute intracranial bleeding.  Chronic microvascular ischemic change.    < end of copied text >  < from: CT Abdomen and Pelvis w/ IV Cont (08.26.23 @ 02:17) >  IMPRESSION:  Large calculi within the bilateral renal pelves measuring up to 2.0 cm on   the right and 2.1 cm on the left with associated mild hydronephrosis.    Mild stranding surrounding the right greater than left renal pelvis and   proximal ureter. Correlate with urinalysis for superimposed infection.    Atherosclerotic changes with moderate narrowing of the left common iliac   artery and severe narrowing of the right internal iliac artery.    < end of copied text >     < from: US Duplex Arterial Lower Ext Compl, Bilateral (08.27.23 @ 12:31) >  IMPRESSION:  *  No significant stenosis or occlusion in either leg.    < end of copied text >    Radiology reports read and imaging reviewed  :  [ x] YES  [ ] NO  (I am not a radiologist and therefore rely on Radiologist reports to facilitate with diagnosis and treatment plans)    Consultant(s) Notes Reviewed:  [x ] YES  [ ] NO    Care Discussed with Consultants/Other Providers [x ] YES  [ ] NO  Care plan and all findings were discussed in detail with patient.  All questions and concerns addressed PROGRESS NOTE:     Patient is a 53y old  Male who presents with a chief complaint of flank  pain (28 Aug 2023 17:13)          SUBJECTIVE & OBJECTIVE:   none   TODAY -- TOV  c/o lower back pain- wityh intermittent numbness that has been present for >1 year    MEDICATIONS  (STANDING):  cefTRIAXone   IVPB 1000 milliGRAM(s) IV Intermittent every 24 hours  finasteride 5 milliGRAM(s) Oral daily  heparin   Injectable 5000 Unit(s) SubCutaneous every 12 hours  pantoprazole  Injectable 40 milliGRAM(s) IV Push daily  sodium chloride 0.9%. 1000 milliLiter(s) (100 mL/Hr) IV Continuous <Continuous>  tamsulosin 0.4 milliGRAM(s) Oral at bedtime    MEDICATIONS  (PRN):  acetaminophen     Tablet .. 650 milliGRAM(s) Oral every 6 hours PRN Mild Pain (1 - 3)  HYDROmorphone  Injectable 1 milliGRAM(s) IV Push every 4 hours PRN Severe Pain (7 - 10)  ondansetron Injectable 4 milliGRAM(s) IV Push every 8 hours PRN Nausea and/or Vomiting  oxyCODONE    IR 5 milliGRAM(s) Oral every 6 hours PRN Moderate Pain (4 - 6)    Vital Signs Last 24 Hrs  T(C): 37.6 (02 Sep 2023 11:38), Max: 37.6 (02 Sep 2023 11:38)  T(F): 99.6 (02 Sep 2023 11:38), Max: 99.6 (02 Sep 2023 11:38)  HR: 100 (02 Sep 2023 11:38) (92 - 100)  BP: 124/72 (02 Sep 2023 11:38) (98/64 - 128/78)  BP(mean): --  RR: 18 (02 Sep 2023 11:38) (17 - 18)  SpO2: 98% (02 Sep 2023 11:38) (97% - 99%)    Parameters below as of 02 Sep 2023 11:38  Patient On (Oxygen Delivery Method): room air          GENERAL: NAD,  no increased WOB  HEAD:  Atraumatic, Normocephalic  EYES: EOMI, PERRLA, conjunctiva and sclera clear  ENMT: Moist mucous membranes  NECK: Supple, No JVD  NERVOUS SYSTEM:  Alert & Oriented X3, no focal neuro deficits   CHEST/LUNG: Clear to auscultation bilaterally; No rales, rhonchi, wheezing, or rubs  HEART: Regular rate and rhythm  ABDOMEN: Soft, Nontender, Nondistended; Bowel sounds present,   EXTREMITIES:  2+ Peripheral Pulses b/l, No clubbing, cyanosis, calf tenderness or edema b/l  Back- mild back pain- lower spine with palapation      LABS:                                     RECENT CULTURES:          RADIOLOGY & ADDITIONAL TESTS:    < from: CT Head No Cont (08.26.23 @ 20:22) >  IMPRESSION:    No acute intracranial bleeding.  Chronic microvascular ischemic change.    < end of copied text >  < from: CT Abdomen and Pelvis w/ IV Cont (08.26.23 @ 02:17) >  IMPRESSION:  Large calculi within the bilateral renal pelves measuring up to 2.0 cm on   the right and 2.1 cm on the left with associated mild hydronephrosis.    Mild stranding surrounding the right greater than left renal pelvis and   proximal ureter. Correlate with urinalysis for superimposed infection.    Atherosclerotic changes with moderate narrowing of the left common iliac   artery and severe narrowing of the right internal iliac artery.    < end of copied text >     < from: US Duplex Arterial Lower Ext Compl, Bilateral (08.27.23 @ 12:31) >  IMPRESSION:  *  No significant stenosis or occlusion in either leg.    < end of copied text >    Radiology reports read and imaging reviewed  :  [ x] YES  [ ] NO  (I am not a radiologist and therefore rely on Radiologist reports to facilitate with diagnosis and treatment plans)    Consultant(s) Notes Reviewed:  [x ] YES  [ ] NO    Care Discussed with Consultants/Other Providers [x ] YES  [ ] NO  Care plan and all findings were discussed in detail with patient.  All questions and concerns addressed

## 2023-09-03 ENCOUNTER — TRANSCRIPTION ENCOUNTER (OUTPATIENT)
Age: 53
End: 2023-09-03

## 2023-09-03 LAB
ANION GAP SERPL CALC-SCNC: 3 MMOL/L — LOW (ref 5–17)
BUN SERPL-MCNC: 7 MG/DL — SIGNIFICANT CHANGE UP (ref 7–23)
CALCIUM SERPL-MCNC: 8.5 MG/DL — SIGNIFICANT CHANGE UP (ref 8.5–10.1)
CHLORIDE SERPL-SCNC: 108 MMOL/L — SIGNIFICANT CHANGE UP (ref 96–108)
CO2 SERPL-SCNC: 31 MMOL/L — SIGNIFICANT CHANGE UP (ref 22–31)
CREAT SERPL-MCNC: 1.41 MG/DL — HIGH (ref 0.5–1.3)
EGFR: 60 ML/MIN/1.73M2 — SIGNIFICANT CHANGE UP
GLUCOSE SERPL-MCNC: 122 MG/DL — HIGH (ref 70–99)
HCT VFR BLD CALC: 27.1 % — LOW (ref 39–50)
HGB BLD-MCNC: 9.5 G/DL — LOW (ref 13–17)
MAGNESIUM SERPL-MCNC: 1.7 MG/DL — SIGNIFICANT CHANGE UP (ref 1.6–2.6)
MCHC RBC-ENTMCNC: 29.3 PG — SIGNIFICANT CHANGE UP (ref 27–34)
MCHC RBC-ENTMCNC: 35.1 G/DL — SIGNIFICANT CHANGE UP (ref 32–36)
MCV RBC AUTO: 83.6 FL — SIGNIFICANT CHANGE UP (ref 80–100)
NRBC # BLD: 0 /100 WBCS — SIGNIFICANT CHANGE UP (ref 0–0)
PHOSPHATE SERPL-MCNC: 2.4 MG/DL — LOW (ref 2.5–4.5)
PLATELET # BLD AUTO: 236 K/UL — SIGNIFICANT CHANGE UP (ref 150–400)
POTASSIUM SERPL-MCNC: 3.7 MMOL/L — SIGNIFICANT CHANGE UP (ref 3.5–5.3)
POTASSIUM SERPL-SCNC: 3.7 MMOL/L — SIGNIFICANT CHANGE UP (ref 3.5–5.3)
RBC # BLD: 3.24 M/UL — LOW (ref 4.2–5.8)
RBC # FLD: 13.2 % — SIGNIFICANT CHANGE UP (ref 10.3–14.5)
SODIUM SERPL-SCNC: 142 MMOL/L — SIGNIFICANT CHANGE UP (ref 135–145)
WBC # BLD: 8.86 K/UL — SIGNIFICANT CHANGE UP (ref 3.8–10.5)
WBC # FLD AUTO: 8.86 K/UL — SIGNIFICANT CHANGE UP (ref 3.8–10.5)

## 2023-09-03 PROCEDURE — 99232 SBSQ HOSP IP/OBS MODERATE 35: CPT

## 2023-09-03 RX ORDER — TAMSULOSIN HYDROCHLORIDE 0.4 MG/1
1 CAPSULE ORAL
Qty: 30 | Refills: 0
Start: 2023-09-03 | End: 2023-10-02

## 2023-09-03 RX ORDER — SODIUM CHLORIDE 9 MG/ML
1000 INJECTION, SOLUTION INTRAVENOUS
Refills: 0 | Status: DISCONTINUED | OUTPATIENT
Start: 2023-09-03 | End: 2023-09-04

## 2023-09-03 RX ORDER — FINASTERIDE 5 MG/1
1 TABLET, FILM COATED ORAL
Qty: 30 | Refills: 0
Start: 2023-09-03 | End: 2023-10-02

## 2023-09-03 RX ORDER — SODIUM CHLORIDE 9 MG/ML
500 INJECTION INTRAMUSCULAR; INTRAVENOUS; SUBCUTANEOUS ONCE
Refills: 0 | Status: COMPLETED | OUTPATIENT
Start: 2023-09-03 | End: 2023-09-03

## 2023-09-03 RX ORDER — POTASSIUM PHOSPHATE, MONOBASIC POTASSIUM PHOSPHATE, DIBASIC 236; 224 MG/ML; MG/ML
15 INJECTION, SOLUTION INTRAVENOUS ONCE
Refills: 0 | Status: COMPLETED | OUTPATIENT
Start: 2023-09-03 | End: 2023-09-03

## 2023-09-03 RX ORDER — LACTULOSE 10 G/15ML
10 SOLUTION ORAL
Refills: 0 | Status: COMPLETED | OUTPATIENT
Start: 2023-09-03 | End: 2023-09-04

## 2023-09-03 RX ORDER — POLYETHYLENE GLYCOL 3350 17 G/17G
17 POWDER, FOR SOLUTION ORAL ONCE
Refills: 0 | Status: COMPLETED | OUTPATIENT
Start: 2023-09-03 | End: 2023-09-03

## 2023-09-03 RX ADMIN — HEPARIN SODIUM 5000 UNIT(S): 5000 INJECTION INTRAVENOUS; SUBCUTANEOUS at 05:23

## 2023-09-03 RX ADMIN — SODIUM CHLORIDE 100 MILLILITER(S): 9 INJECTION, SOLUTION INTRAVENOUS at 17:07

## 2023-09-03 RX ADMIN — POLYETHYLENE GLYCOL 3350 17 GRAM(S): 17 POWDER, FOR SOLUTION ORAL at 17:06

## 2023-09-03 RX ADMIN — PANTOPRAZOLE SODIUM 40 MILLIGRAM(S): 20 TABLET, DELAYED RELEASE ORAL at 12:49

## 2023-09-03 RX ADMIN — OXYCODONE HYDROCHLORIDE 5 MILLIGRAM(S): 5 TABLET ORAL at 10:15

## 2023-09-03 RX ADMIN — TAMSULOSIN HYDROCHLORIDE 0.4 MILLIGRAM(S): 0.4 CAPSULE ORAL at 21:27

## 2023-09-03 RX ADMIN — CEFTRIAXONE 100 MILLIGRAM(S): 500 INJECTION, POWDER, FOR SOLUTION INTRAMUSCULAR; INTRAVENOUS at 05:24

## 2023-09-03 RX ADMIN — LACTULOSE 10 GRAM(S): 10 SOLUTION ORAL at 17:06

## 2023-09-03 RX ADMIN — OXYCODONE HYDROCHLORIDE 5 MILLIGRAM(S): 5 TABLET ORAL at 22:27

## 2023-09-03 RX ADMIN — SODIUM CHLORIDE 500 MILLILITER(S): 9 INJECTION INTRAMUSCULAR; INTRAVENOUS; SUBCUTANEOUS at 13:45

## 2023-09-03 RX ADMIN — FINASTERIDE 5 MILLIGRAM(S): 5 TABLET, FILM COATED ORAL at 12:49

## 2023-09-03 RX ADMIN — POTASSIUM PHOSPHATE, MONOBASIC POTASSIUM PHOSPHATE, DIBASIC 62.5 MILLIMOLE(S): 236; 224 INJECTION, SOLUTION INTRAVENOUS at 12:49

## 2023-09-03 RX ADMIN — OXYCODONE HYDROCHLORIDE 5 MILLIGRAM(S): 5 TABLET ORAL at 21:27

## 2023-09-03 RX ADMIN — HEPARIN SODIUM 5000 UNIT(S): 5000 INJECTION INTRAVENOUS; SUBCUTANEOUS at 18:11

## 2023-09-03 NOTE — PROGRESS NOTE ADULT - SUBJECTIVE AND OBJECTIVE BOX
Patient seen and examined at bedside in no distress.  No complaints. Voiding without issue overnight.   Denies abdominal discomfort.     T(F): 98.8 (09-03-23 @ 05:33), Max: 99.6 (09-02-23 @ 11:38)  HR: 89 (09-03-23 @ 05:33) (88 - 100)  BP: 123/78 (09-03-23 @ 05:33) (112/63 - 129/79)  RR: 17 (09-03-23 @ 05:33) (17 - 18)  SpO2: 97% (09-03-23 @ 05:33) (97% - 98%)    PHYSICAL EXAM:  General: Alert & oriented x 3  CV: +S1S2 regular rate and rhythm  Lung: Respirations nonlabored   Abdomen: Soft  : No SP tenderness, no palpable bladder distention    LABS:  AM labs pending    A/P: 53M POD3 s/p cystoscopy, uteroscopy lithotripsy with bilateral ureteral stents. Most recent PVR 40cc  -- continue flomax/finasteride  -- no  contraindication to discharge home, needs OP f/u with Dr. Medina in 7-10 days  -- medical management and supportive care    Patient seen and examined at bedside in no distress.  No complaints. Voiding without issue overnight.   Denies abdominal discomfort.     T(F): 98.8 (09-03-23 @ 05:33), Max: 99.6 (09-02-23 @ 11:38)  HR: 89 (09-03-23 @ 05:33) (88 - 100)  BP: 123/78 (09-03-23 @ 05:33) (112/63 - 129/79)  RR: 17 (09-03-23 @ 05:33) (17 - 18)  SpO2: 97% (09-03-23 @ 05:33) (97% - 98%)    PHYSICAL EXAM:  General: Alert & oriented x 3  CV: +S1S2 regular rate and rhythm  Lung: Respirations nonlabored   Abdomen: Soft  : No SP tenderness, no palpable bladder distention    LABS:  AM labs pending    A/P: 53M POD3 s/p cystoscopy, uteroscopy lithotripsy with bilateral ureteral stents. Most recent PVR 40cc     Patient seen and examined at bedside in no distress.  No complaints. Voiding without issue overnight.   Denies abdominal discomfort.     T(F): 98.8 (09-03-23 @ 05:33), Max: 99.6 (09-02-23 @ 11:38)  HR: 89 (09-03-23 @ 05:33) (88 - 100)  BP: 123/78 (09-03-23 @ 05:33) (112/63 - 129/79)  RR: 17 (09-03-23 @ 05:33) (17 - 18)  SpO2: 97% (09-03-23 @ 05:33) (97% - 98%)    PHYSICAL EXAM:  General: Alert & oriented x 3  CV: +S1S2 regular rate and rhythm  Lung: Respirations nonlabored   Abdomen: Soft  : No SP tenderness, no palpable bladder distention    LABS:  AM labs pending    A/P: 53M POD3 s/p cystoscopy, uteroscopy lithotripsy with bilateral ureteral stents. Most recent PVRs 40cc, 45cc  -- no  contraindication to discharge, OP f/u with Dr. Medina in 7-10 days   -- management per primary team

## 2023-09-03 NOTE — DISCHARGE NOTE PROVIDER - CARE PROVIDER_API CALL
Lucius Medina.  Urology  [address] [place] [address]  Phone: [phone]  Fax: [phone]  Follow Up Time:     Martita Avery  Orthopaedic Surgery  [address] [facility], [place] [address]  Phone: [phone]  Fax: [phone]  Follow Up Time:

## 2023-09-03 NOTE — DISCHARGE NOTE PROVIDER - NSDCMRMEDTOKEN_GEN_ALL_CORE_FT
finasteride 5 mg oral tablet: 1 tab(s) orally once a day  tamsulosin 0.4 mg oral capsule: 1 cap(s) orally once a day (at bedtime)

## 2023-09-03 NOTE — PROGRESS NOTE ADULT - SUBJECTIVE AND OBJECTIVE BOX
PROGRESS NOTE:     Patient is a 53y old  Male who presents with a chief complaint of flank  pain (28 Aug 2023 17:13)          SUBJECTIVE & OBJECTIVE:   today c/o dizziness and weakness     asked nurse to perform orthostatics and none appreciated     MEDICATIONS  (STANDING):  finasteride 5 milliGRAM(s) Oral daily  heparin   Injectable 5000 Unit(s) SubCutaneous every 12 hours  pantoprazole  Injectable 40 milliGRAM(s) IV Push daily  sodium chloride 0.9% Bolus 500 milliLiter(s) IV Bolus once  sodium chloride 0.9%. 1000 milliLiter(s) (100 mL/Hr) IV Continuous <Continuous>  tamsulosin 0.4 milliGRAM(s) Oral at bedtime    MEDICATIONS  (PRN):  acetaminophen     Tablet .. 650 milliGRAM(s) Oral every 6 hours PRN Mild Pain (1 - 3)  ondansetron Injectable 4 milliGRAM(s) IV Push every 8 hours PRN Nausea and/or Vomiting  oxyCODONE    IR 5 milliGRAM(s) Oral every 6 hours PRN Moderate Pain (4 - 6)    Vital Signs Last 24 Hrs  T(C): 37.3 (03 Sep 2023 11:24), Max: 37.3 (02 Sep 2023 23:41)  T(F): 99.1 (03 Sep 2023 11:24), Max: 99.2 (02 Sep 2023 23:41)  HR: 86 (03 Sep 2023 11:45) (86 - 97)  BP: 134/76 (03 Sep 2023 11:45) (112/63 - 134/76)  BP(mean): --  RR: 18 (03 Sep 2023 11:24) (17 - 18)  SpO2: 97% (03 Sep 2023 11:24) (97% - 97%)    Parameters below as of 03 Sep 2023 11:24  Patient On (Oxygen Delivery Method): room air      GENERAL: NAD,  no increased WOB  HEAD:  Atraumatic, Normocephalic  EYES: EOMI, PERRLA, conjunctiva and sclera clear  ENMT: Moist mucous membranes  NECK: Supple, No JVD  NERVOUS SYSTEM:  Alert & Oriented X3, no focal neuro deficits   CHEST/LUNG: Clear to auscultation bilaterally; No rales, rhonchi, wheezing, or rubs  HEART: Regular rate and rhythm  ABDOMEN: Soft, Nontender, Nondistended; Bowel sounds present,   EXTREMITIES:  2+ Peripheral Pulses b/l, No clubbing, cyanosis, calf tenderness or edema b/l  Back- mild back pain- lower spine with palpation      LABS:                         9.5    8.86  )-----------( 236      ( 03 Sep 2023 07:49 )             27.1       09-03    142  |  108  |  7   ----------------------------<  122<H>  3.7   |  31  |  1.41<H>    Ca    8.5      03 Sep 2023 07:49  Phos  2.4     09-03  Mg     1.7     09-03                   ECENT CULTURES:          RADIOLOGY & ADDITIONAL TESTS:    < from: CT Head No Cont (08.26.23 @ 20:22) >  IMPRESSION:    No acute intracranial bleeding.  Chronic microvascular ischemic change.    < end of copied text >  < from: CT Abdomen and Pelvis w/ IV Cont (08.26.23 @ 02:17) >  IMPRESSION:  Large calculi within the bilateral renal pelves measuring up to 2.0 cm on   the right and 2.1 cm on the left with associated mild hydronephrosis.    Mild stranding surrounding the right greater than left renal pelvis and   proximal ureter. Correlate with urinalysis for superimposed infection.    Atherosclerotic changes with moderate narrowing of the left common iliac   artery and severe narrowing of the right internal iliac artery.    < end of copied text >     < from: US Duplex Arterial Lower Ext Compl, Bilateral (08.27.23 @ 12:31) >  IMPRESSION:  *  No significant stenosis or occlusion in either leg.    < end of copied text >    Radiology reports read and imaging reviewed  :  [ x] YES  [ ] NO  (I am not a radiologist and therefore rely on Radiologist reports to facilitate with diagnosis and treatment plans)    Consultant(s) Notes Reviewed:  [x ] YES  [ ] NO    Care Discussed with Consultants/Other Providers [x ] YES  [ ] NO  Care plan and all findings were discussed in detail with patient.  All questions and concerns addressed

## 2023-09-03 NOTE — DISCHARGE NOTE PROVIDER - NSDCCPCAREPLAN_GEN_ALL_CORE_FT
PRINCIPAL DISCHARGE DIAGNOSIS  Diagnosis: Syncope  Assessment and Plan of Treatment: f/u with Dr. Avery, Dr. Medina       SECONDARY DISCHARGE DIAGNOSES  Diagnosis: UTI (urinary tract infection)  Assessment and Plan of Treatment:     Diagnosis: Bilateral flank pain  Assessment and Plan of Treatment:     Diagnosis: Coffee ground emesis  Assessment and Plan of Treatment:     Diagnosis: Orthostatic hypotension  Assessment and Plan of Treatment:      PRINCIPAL DISCHARGE DIAGNOSIS  Diagnosis: Pyelonephritis  Assessment and Plan of Treatment:       SECONDARY DISCHARGE DIAGNOSES  Diagnosis: UTI (urinary tract infection)  Assessment and Plan of Treatment:     Diagnosis: Bilateral flank pain  Assessment and Plan of Treatment:     Diagnosis: Orthostatic hypotension  Assessment and Plan of Treatment: resolved    Diagnosis: Nephrolithiasis  Assessment and Plan of Treatment: s/p b/l stents    Diagnosis: Nephrolithiasis  Assessment and Plan of Treatment:

## 2023-09-03 NOTE — DISCHARGE NOTE PROVIDER - CARE PROVIDERS DIRECT ADDRESSES
[email or web address]

## 2023-09-03 NOTE — DISCHARGE NOTE PROVIDER - ATTENDING DISCHARGE PHYSICAL EXAMINATION:
GENERAL: NAD,  no increased WOB  HEAD:  Atraumatic, Normocephalic  EYES: EOMI, PERRLA, conjunctiva and sclera clear  ENMT: Moist mucous membranes  NECK: Supple, No JVD  NERVOUS SYSTEM:  Alert & Oriented X3, no focal neuro deficits   CHEST/LUNG: Clear to auscultation bilaterally; No rales, rhonchi, wheezing, or rubs  HEART: Regular rate and rhythm  ABDOMEN: Soft, Nontender, Nondistended; Bowel sounds present,   EXTREMITIES:  2+ Peripheral Pulses b/l, No clubbing, cyanosis, calf tenderness or edema b/l  Back- mild back pain- lower spine with palpation

## 2023-09-03 NOTE — PROGRESS NOTE ADULT - PROVIDER SPECIALTY LIST ADULT
Urology
Hospitalist
Hospitalist
Urology
Hospitalist
Urology
Hospitalist
Urology

## 2023-09-03 NOTE — PROGRESS NOTE ADULT - ASSESSMENT
53  yr  M with   no  pmhx and  takes  no  meds c/o  b/l flank pain  for  past 4  months or  more. Denies   fevers/ chills/ cp/sob. Denies  rectal  bleed/  hematemesis     Pyelonephritis/Nephrolithiasis  -Present with b/l flank pain  -elevated wbc, elevated lactate, normalize after IVF  -UA positive for wbc, blood and LE  -CT A/P: Large calculi within the bilateral renal pelves measuring up to 2.0 cm on the right and 2.1 cm on the left with associated mild hydronephrosis    PLAN  -C/w Rocephin 1G  -Pain control  -Ucx- ngtd  -Urology consulted, plan for lithotripsy on Thursday 8/31/2023 for lithotripsy today    9/1/2023 s/p lithotripsy now with urinary retention, ennis placed and started on flomax per urology team .   9/2/2023 TOV today     PAD  - per my colleague's documentation " reports of intermittent numbness/pain in RLE  - Has not been to doctor for years  - CT A/P: Atherosclerotic changes with moderate narrowing of the left common iliac artery and severe narrowing of the right internal iliac artery  - Doppler LE: No significant stenosis or occlusion in either le  - TTE: Normal global left ventricular systolic function. No evidence of mitral valve regurgitation. Mild tricuspid regurgitation  - A1C: 6.2  - LDL: 69"    8/30/2023 - vascular consult reviewed , Recommend no vascular intervention or further workup at this time   chronic intermittent low back pain- with intermittent numbness-  for >1 year ,  advised to f/u ortho spine - [facility]. 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       Pre-diabetes  A1C 6.2-- educated --will need f/u outpt  with pcp         53  yr  M with   no  pmhx and  takes  no  meds c/o  b/l flank pain  for  past 4  months or  more. Denies   fevers/ chills/ cp/sob. Denies  rectal  bleed/  hematemesis     Pyelonephritis/Nephrolithiasis  -Present with b/l flank pain  -elevated wbc, elevated lactate, normalize after IVF  -UA positive for wbc, blood and LE  -CT A/P: Large calculi within the bilateral renal pelves measuring up to 2.0 cm on the right and 2.1 cm on the left with associated mild hydronephrosis    PLAN  -C/w Rocephin 1G  -Pain control  -Ucx- ngtd  -Urology consulted, plan for lithotripsy on Thursday 8/31/2023 for lithotripsy today    9/1/2023 s/p lithotripsy now with urinary retention, ennis placed and started on flomax per urology team .   9/2/2023 TOV today   9/3/2023 passed TOV    PAD  - per my colleague's documentation " reports of intermittent numbness/pain in RLE  - Has not been to doctor for years  - CT A/P: Atherosclerotic changes with moderate narrowing of the left common iliac artery and severe narrowing of the right internal iliac artery  - Doppler LE: No significant stenosis or occlusion in either le  - TTE: Normal global left ventricular systolic function. No evidence of mitral valve regurgitation. Mild tricuspid regurgitation  - A1C: 6.2  - LDL: 69"    8/30/2023 - vascular consult reviewed , Recommend no vascular intervention or further workup at this time   chronic intermittent low back pain- with intermittent numbness-  for >1 year ,  advised to f/u ortho spine - will [facility]. 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       Pre-diabetes  A1C 6.2-- educated --will need f/u outpt  with pcp      c/o dizziness. checked orthostatics and none appreciated will give 500 cc bolus and reassess

## 2023-09-03 NOTE — DISCHARGE NOTE PROVIDER - HOSPITAL COURSE
HPI:    53  yr  m    with   no  pmx/  an d takes  no  meds      c/o  b/l flank pain  fpr  past 4  months or  more    fiancee  at bedside    denies   fevers/ n// cp/sob/  ab d pain    had  vomiting   fpr  past  2 days. , and   er  had  placed   an  ng  tube    no  further   vomiting (26 Aug 2023 12:09)  In hospital pt admitted to med surg unit.    Pyelonephritis/Nephrolithiasis  -Present with b/l flank pain  -elevated wbc, elevated lactate, normalize after IVF  -UA positive for wbc, blood and LE  -CT A/P: Large calculi within the bilateral renal pelves measuring up to 2.0 cm on the right and 2.1 cm on the left with associated mild hydronephrosis    PLAN  -C/w Rocephin 1G  -Pain control  -Ucx- ngtd  -Urology consulted, plan for lithotripsy on Thursday 8/31/2023 for lithotripsy today    9/1/2023 s/p lithotripsy now with urinary retention, ennis placed and started on flomax per urology team .   9/2/2023 TOV today     PAD  - per my colleague's documentation " reports of intermittent numbness/pain in RLE  - Has not been to doctor for years  - CT A/P: Atherosclerotic changes with moderate narrowing of the left common iliac artery and severe narrowing of the right internal iliac artery  - Doppler LE: No significant stenosis or occlusion in either le  - TTE: Normal global left ventricular systolic function. No evidence of mitral valve regurgitation. Mild tricuspid regurgitation  - A1C: 6.2  - LDL: 69"    8/30/2023 - vascular consult reviewed , Recommend no vascular intervention or further workup at this time   chronic intermittent low back pain- with intermittent numbness-  for >1 year ,  advised to f/u ortho spine - [facility]. 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       Pre-diabetes  A1C 6.2-- educated --will need f/u outpt  with pcp     Pt for discharge home, to f/u with urology and Dr. Avery.    53  yr  malepresents with flank pain. Admitted with pyelonephritis and nephrolithiasis    In hospital pt admitted to med surg unit.    Pyelonephritis/Nephrolithiasis  Present with b/l flank pain  Elevated wbc, elevated lactate, normalize after IVF  UA positive for wbc, blood and LE  CT A/P: Large calculi within the bilateral renal pelves measuring up to 2.0 cm on the right and 2.1 cm on the left with associated mild hydronephrosis    Received Rocephin 1G  Pain control  Ucx- ngtd  Urology consulted  9/1/2023 s/p lithotripsy now with urinary retention, ennis placed and started on flomax per urology team .   9/2/2023 TOV today     PAD    CT A/P: Atherosclerotic changes with moderate narrowing of the left common iliac artery and severe narrowing of the right internal iliac artery  Doppler LE: No significant stenosis or occlusion in either le  TTE: Normal global left ventricular systolic function. No evidence of mitral valve regurgitation. Mild tricuspid regurgitation  A1C: 6.2  LDL: 69"  8/30/2023 - vascular consult reviewed , Recommend no vascular intervention or further workup at this time   chronic intermittent low back pain- with intermittent numbness-  for >1 year ,  advised to f/u ortho spine - [facility]. 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     Pre-diabetes  A1C 6.2-- educated --will need f/u outpt  with pcp     Pt for discharge home, to f/u with urology and Dr. Avery.   On 9/4/23 this case was reviewed with Dr. Llanes, the patient is medically stable and optimized for discharge.

## 2023-09-03 NOTE — PROGRESS NOTE ADULT - REASON FOR ADMISSION
flank  pain

## 2023-09-04 ENCOUNTER — TRANSCRIPTION ENCOUNTER (OUTPATIENT)
Age: 53
End: 2023-09-04

## 2023-09-04 VITALS
TEMPERATURE: 98 F | HEART RATE: 88 BPM | SYSTOLIC BLOOD PRESSURE: 121 MMHG | DIASTOLIC BLOOD PRESSURE: 73 MMHG | RESPIRATION RATE: 18 BRPM | OXYGEN SATURATION: 98 %

## 2023-09-04 LAB
ANION GAP SERPL CALC-SCNC: 1 MMOL/L — LOW (ref 5–17)
BUN SERPL-MCNC: 5 MG/DL — LOW (ref 7–23)
CALCIUM SERPL-MCNC: 8.3 MG/DL — LOW (ref 8.5–10.1)
CHLORIDE SERPL-SCNC: 106 MMOL/L — SIGNIFICANT CHANGE UP (ref 96–108)
CO2 SERPL-SCNC: 32 MMOL/L — HIGH (ref 22–31)
CREAT SERPL-MCNC: 1.25 MG/DL — SIGNIFICANT CHANGE UP (ref 0.5–1.3)
EGFR: 69 ML/MIN/1.73M2 — SIGNIFICANT CHANGE UP
GLUCOSE SERPL-MCNC: 117 MG/DL — HIGH (ref 70–99)
HCT VFR BLD CALC: 26.2 % — LOW (ref 39–50)
HGB BLD-MCNC: 9.2 G/DL — LOW (ref 13–17)
MAGNESIUM SERPL-MCNC: 1.9 MG/DL — SIGNIFICANT CHANGE UP (ref 1.6–2.6)
MCHC RBC-ENTMCNC: 29.2 PG — SIGNIFICANT CHANGE UP (ref 27–34)
MCHC RBC-ENTMCNC: 35.1 G/DL — SIGNIFICANT CHANGE UP (ref 32–36)
MCV RBC AUTO: 83.2 FL — SIGNIFICANT CHANGE UP (ref 80–100)
NRBC # BLD: 0 /100 WBCS — SIGNIFICANT CHANGE UP (ref 0–0)
PHOSPHATE SERPL-MCNC: 2.6 MG/DL — SIGNIFICANT CHANGE UP (ref 2.5–4.5)
PLATELET # BLD AUTO: 271 K/UL — SIGNIFICANT CHANGE UP (ref 150–400)
POTASSIUM SERPL-MCNC: 3.8 MMOL/L — SIGNIFICANT CHANGE UP (ref 3.5–5.3)
POTASSIUM SERPL-SCNC: 3.8 MMOL/L — SIGNIFICANT CHANGE UP (ref 3.5–5.3)
RBC # BLD: 3.15 M/UL — LOW (ref 4.2–5.8)
RBC # FLD: 13.1 % — SIGNIFICANT CHANGE UP (ref 10.3–14.5)
SODIUM SERPL-SCNC: 139 MMOL/L — SIGNIFICANT CHANGE UP (ref 135–145)
WBC # BLD: 8.44 K/UL — SIGNIFICANT CHANGE UP (ref 3.8–10.5)
WBC # FLD AUTO: 8.44 K/UL — SIGNIFICANT CHANGE UP (ref 3.8–10.5)

## 2023-09-04 PROCEDURE — 99239 HOSP IP/OBS DSCHRG MGMT >30: CPT

## 2023-09-04 RX ADMIN — HEPARIN SODIUM 5000 UNIT(S): 5000 INJECTION INTRAVENOUS; SUBCUTANEOUS at 05:14

## 2023-09-04 RX ADMIN — FINASTERIDE 5 MILLIGRAM(S): 5 TABLET, FILM COATED ORAL at 11:31

## 2023-09-04 RX ADMIN — OXYCODONE HYDROCHLORIDE 5 MILLIGRAM(S): 5 TABLET ORAL at 05:13

## 2023-09-04 RX ADMIN — LACTULOSE 10 GRAM(S): 10 SOLUTION ORAL at 05:15

## 2023-09-04 RX ADMIN — PANTOPRAZOLE SODIUM 40 MILLIGRAM(S): 20 TABLET, DELAYED RELEASE ORAL at 11:30

## 2023-09-04 RX ADMIN — OXYCODONE HYDROCHLORIDE 5 MILLIGRAM(S): 5 TABLET ORAL at 06:13

## 2023-09-04 RX ADMIN — SODIUM CHLORIDE 100 MILLILITER(S): 9 INJECTION, SOLUTION INTRAVENOUS at 05:13

## 2023-09-04 NOTE — DISCHARGE NOTE NURSING/CASE MANAGEMENT/SOCIAL WORK - PATIENT PORTAL LINK FT
You can access the FollowMyHealth Patient Portal offered by [facility] by registering at the following website: [email or web address]. By joining [facility]’s FollowMyHealth portal, you will also be able to view your health information using other applications (apps) compatible with our system.

## 2023-09-10 PROBLEM — Z00.00 ENCOUNTER FOR PREVENTIVE HEALTH EXAMINATION: Status: ACTIVE | Noted: 2023-09-10

## 2023-09-12 DIAGNOSIS — N17.9 ACUTE KIDNEY FAILURE, UNSPECIFIED: ICD-10-CM

## 2023-09-12 DIAGNOSIS — I70.8 ATHEROSCLEROSIS OF OTHER ARTERIES: ICD-10-CM

## 2023-09-12 DIAGNOSIS — M54.50 LOW BACK PAIN, UNSPECIFIED: ICD-10-CM

## 2023-09-12 DIAGNOSIS — N13.6 PYONEPHROSIS: ICD-10-CM

## 2023-09-12 DIAGNOSIS — F12.90 CANNABIS USE, UNSPECIFIED, UNCOMPLICATED: ICD-10-CM

## 2023-09-12 DIAGNOSIS — R73.03 PREDIABETES: ICD-10-CM

## 2023-09-12 DIAGNOSIS — G89.29 OTHER CHRONIC PAIN: ICD-10-CM

## 2023-09-12 DIAGNOSIS — R33.8 OTHER RETENTION OF URINE: ICD-10-CM

## 2023-09-12 DIAGNOSIS — I95.1 ORTHOSTATIC HYPOTENSION: ICD-10-CM

## 2023-09-19 ENCOUNTER — APPOINTMENT (OUTPATIENT)
Dept: UROLOGY | Facility: CLINIC | Age: 53
End: 2023-09-19
Payer: MEDICAID

## 2023-09-19 VITALS
DIASTOLIC BLOOD PRESSURE: 96 MMHG | SYSTOLIC BLOOD PRESSURE: 147 MMHG | TEMPERATURE: 98.2 F | OXYGEN SATURATION: 100 % | HEART RATE: 94 BPM | BODY MASS INDEX: 21 KG/M2 | HEIGHT: 71 IN | WEIGHT: 150 LBS

## 2023-09-19 DIAGNOSIS — N20.0 CALCULUS OF KIDNEY: ICD-10-CM

## 2023-09-19 DIAGNOSIS — Z96.0 PRESENCE OF UROGENITAL IMPLANTS: ICD-10-CM

## 2023-09-19 DIAGNOSIS — F17.200 NICOTINE DEPENDENCE, UNSPECIFIED, UNCOMPLICATED: ICD-10-CM

## 2023-09-19 PROCEDURE — 99213 OFFICE O/P EST LOW 20 MIN: CPT | Mod: 25

## 2023-09-19 PROCEDURE — 52310 CYSTOSCOPY AND TREATMENT: CPT | Mod: 58

## 2023-11-29 ENCOUNTER — OUTPATIENT (OUTPATIENT)
Dept: OUTPATIENT SERVICES | Facility: HOSPITAL | Age: 53
LOS: 1 days | Discharge: HOME | End: 2023-11-29
Payer: MEDICAID

## 2023-11-29 VITALS — DIASTOLIC BLOOD PRESSURE: 80 MMHG | SYSTOLIC BLOOD PRESSURE: 120 MMHG

## 2023-11-29 VITALS
WEIGHT: 160.28 LBS | OXYGEN SATURATION: 97 % | HEIGHT: 67 IN | HEART RATE: 91 BPM | TEMPERATURE: 98 F | RESPIRATION RATE: 17 BRPM

## 2023-11-29 DIAGNOSIS — Z01.818 ENCOUNTER FOR OTHER PREPROCEDURAL EXAMINATION: ICD-10-CM

## 2023-11-29 DIAGNOSIS — N20.0 CALCULUS OF KIDNEY: ICD-10-CM

## 2023-11-29 DIAGNOSIS — Z98.890 OTHER SPECIFIED POSTPROCEDURAL STATES: Chronic | ICD-10-CM

## 2023-11-29 LAB
ANION GAP SERPL CALC-SCNC: 4 MMOL/L — LOW (ref 5–17)
ANION GAP SERPL CALC-SCNC: 4 MMOL/L — LOW (ref 5–17)
APPEARANCE UR: CLEAR — SIGNIFICANT CHANGE UP
APPEARANCE UR: CLEAR — SIGNIFICANT CHANGE UP
BASOPHILS # BLD AUTO: 0.04 K/UL — SIGNIFICANT CHANGE UP (ref 0–0.2)
BASOPHILS # BLD AUTO: 0.04 K/UL — SIGNIFICANT CHANGE UP (ref 0–0.2)
BASOPHILS NFR BLD AUTO: 0.5 % — SIGNIFICANT CHANGE UP (ref 0–2)
BASOPHILS NFR BLD AUTO: 0.5 % — SIGNIFICANT CHANGE UP (ref 0–2)
BILIRUB UR-MCNC: NEGATIVE — SIGNIFICANT CHANGE UP
BILIRUB UR-MCNC: NEGATIVE — SIGNIFICANT CHANGE UP
BUN SERPL-MCNC: 23 MG/DL — SIGNIFICANT CHANGE UP (ref 7–23)
BUN SERPL-MCNC: 23 MG/DL — SIGNIFICANT CHANGE UP (ref 7–23)
CALCIUM SERPL-MCNC: 8.9 MG/DL — SIGNIFICANT CHANGE UP (ref 8.5–10.1)
CALCIUM SERPL-MCNC: 8.9 MG/DL — SIGNIFICANT CHANGE UP (ref 8.5–10.1)
CHLORIDE SERPL-SCNC: 107 MMOL/L — SIGNIFICANT CHANGE UP (ref 96–108)
CHLORIDE SERPL-SCNC: 107 MMOL/L — SIGNIFICANT CHANGE UP (ref 96–108)
CO2 SERPL-SCNC: 29 MMOL/L — SIGNIFICANT CHANGE UP (ref 22–31)
CO2 SERPL-SCNC: 29 MMOL/L — SIGNIFICANT CHANGE UP (ref 22–31)
COLOR SPEC: YELLOW — SIGNIFICANT CHANGE UP
COLOR SPEC: YELLOW — SIGNIFICANT CHANGE UP
CREAT SERPL-MCNC: 1.25 MG/DL — SIGNIFICANT CHANGE UP (ref 0.5–1.3)
CREAT SERPL-MCNC: 1.25 MG/DL — SIGNIFICANT CHANGE UP (ref 0.5–1.3)
DIFF PNL FLD: ABNORMAL
DIFF PNL FLD: ABNORMAL
EGFR: 69 ML/MIN/1.73M2 — SIGNIFICANT CHANGE UP
EGFR: 69 ML/MIN/1.73M2 — SIGNIFICANT CHANGE UP
EOSINOPHIL # BLD AUTO: 0.24 K/UL — SIGNIFICANT CHANGE UP (ref 0–0.5)
EOSINOPHIL # BLD AUTO: 0.24 K/UL — SIGNIFICANT CHANGE UP (ref 0–0.5)
EOSINOPHIL NFR BLD AUTO: 3.2 % — SIGNIFICANT CHANGE UP (ref 0–6)
EOSINOPHIL NFR BLD AUTO: 3.2 % — SIGNIFICANT CHANGE UP (ref 0–6)
GLUCOSE SERPL-MCNC: 115 MG/DL — HIGH (ref 70–99)
GLUCOSE SERPL-MCNC: 115 MG/DL — HIGH (ref 70–99)
GLUCOSE UR QL: NEGATIVE MG/DL — SIGNIFICANT CHANGE UP
GLUCOSE UR QL: NEGATIVE MG/DL — SIGNIFICANT CHANGE UP
HCT VFR BLD CALC: 40.9 % — SIGNIFICANT CHANGE UP (ref 39–50)
HCT VFR BLD CALC: 40.9 % — SIGNIFICANT CHANGE UP (ref 39–50)
HGB BLD-MCNC: 14.4 G/DL — SIGNIFICANT CHANGE UP (ref 13–17)
HGB BLD-MCNC: 14.4 G/DL — SIGNIFICANT CHANGE UP (ref 13–17)
IMM GRANULOCYTES NFR BLD AUTO: 0.1 % — SIGNIFICANT CHANGE UP (ref 0–0.9)
IMM GRANULOCYTES NFR BLD AUTO: 0.1 % — SIGNIFICANT CHANGE UP (ref 0–0.9)
KETONES UR-MCNC: NEGATIVE MG/DL — SIGNIFICANT CHANGE UP
KETONES UR-MCNC: NEGATIVE MG/DL — SIGNIFICANT CHANGE UP
LEUKOCYTE ESTERASE UR-ACNC: ABNORMAL
LEUKOCYTE ESTERASE UR-ACNC: ABNORMAL
LYMPHOCYTES # BLD AUTO: 3.56 K/UL — HIGH (ref 1–3.3)
LYMPHOCYTES # BLD AUTO: 3.56 K/UL — HIGH (ref 1–3.3)
LYMPHOCYTES # BLD AUTO: 47.2 % — HIGH (ref 13–44)
LYMPHOCYTES # BLD AUTO: 47.2 % — HIGH (ref 13–44)
MCHC RBC-ENTMCNC: 28 PG — SIGNIFICANT CHANGE UP (ref 27–34)
MCHC RBC-ENTMCNC: 28 PG — SIGNIFICANT CHANGE UP (ref 27–34)
MCHC RBC-ENTMCNC: 35.2 G/DL — SIGNIFICANT CHANGE UP (ref 32–36)
MCHC RBC-ENTMCNC: 35.2 G/DL — SIGNIFICANT CHANGE UP (ref 32–36)
MCV RBC AUTO: 79.4 FL — LOW (ref 80–100)
MCV RBC AUTO: 79.4 FL — LOW (ref 80–100)
MONOCYTES # BLD AUTO: 0.62 K/UL — SIGNIFICANT CHANGE UP (ref 0–0.9)
MONOCYTES # BLD AUTO: 0.62 K/UL — SIGNIFICANT CHANGE UP (ref 0–0.9)
MONOCYTES NFR BLD AUTO: 8.2 % — SIGNIFICANT CHANGE UP (ref 2–14)
MONOCYTES NFR BLD AUTO: 8.2 % — SIGNIFICANT CHANGE UP (ref 2–14)
NEUTROPHILS # BLD AUTO: 3.07 K/UL — SIGNIFICANT CHANGE UP (ref 1.8–7.4)
NEUTROPHILS # BLD AUTO: 3.07 K/UL — SIGNIFICANT CHANGE UP (ref 1.8–7.4)
NEUTROPHILS NFR BLD AUTO: 40.8 % — LOW (ref 43–77)
NEUTROPHILS NFR BLD AUTO: 40.8 % — LOW (ref 43–77)
NITRITE UR-MCNC: NEGATIVE — SIGNIFICANT CHANGE UP
NITRITE UR-MCNC: NEGATIVE — SIGNIFICANT CHANGE UP
NRBC # BLD: 0 /100 WBCS — SIGNIFICANT CHANGE UP (ref 0–0)
NRBC # BLD: 0 /100 WBCS — SIGNIFICANT CHANGE UP (ref 0–0)
PH UR: 5.5 — SIGNIFICANT CHANGE UP (ref 5–8)
PH UR: 5.5 — SIGNIFICANT CHANGE UP (ref 5–8)
PLATELET # BLD AUTO: 241 K/UL — SIGNIFICANT CHANGE UP (ref 150–400)
PLATELET # BLD AUTO: 241 K/UL — SIGNIFICANT CHANGE UP (ref 150–400)
POTASSIUM SERPL-MCNC: 3.9 MMOL/L — SIGNIFICANT CHANGE UP (ref 3.5–5.3)
POTASSIUM SERPL-MCNC: 3.9 MMOL/L — SIGNIFICANT CHANGE UP (ref 3.5–5.3)
POTASSIUM SERPL-SCNC: 3.9 MMOL/L — SIGNIFICANT CHANGE UP (ref 3.5–5.3)
POTASSIUM SERPL-SCNC: 3.9 MMOL/L — SIGNIFICANT CHANGE UP (ref 3.5–5.3)
PROT UR-MCNC: 30 MG/DL
PROT UR-MCNC: 30 MG/DL
RBC # BLD: 5.15 M/UL — SIGNIFICANT CHANGE UP (ref 4.2–5.8)
RBC # BLD: 5.15 M/UL — SIGNIFICANT CHANGE UP (ref 4.2–5.8)
RBC # FLD: 13.2 % — SIGNIFICANT CHANGE UP (ref 10.3–14.5)
RBC # FLD: 13.2 % — SIGNIFICANT CHANGE UP (ref 10.3–14.5)
SODIUM SERPL-SCNC: 140 MMOL/L — SIGNIFICANT CHANGE UP (ref 135–145)
SODIUM SERPL-SCNC: 140 MMOL/L — SIGNIFICANT CHANGE UP (ref 135–145)
SP GR SPEC: 1.01 — SIGNIFICANT CHANGE UP (ref 1–1.03)
SP GR SPEC: 1.01 — SIGNIFICANT CHANGE UP (ref 1–1.03)
UROBILINOGEN FLD QL: 0.2 MG/DL — SIGNIFICANT CHANGE UP (ref 0.2–1)
UROBILINOGEN FLD QL: 0.2 MG/DL — SIGNIFICANT CHANGE UP (ref 0.2–1)
WBC # BLD: 7.54 K/UL — SIGNIFICANT CHANGE UP (ref 3.8–10.5)
WBC # BLD: 7.54 K/UL — SIGNIFICANT CHANGE UP (ref 3.8–10.5)
WBC # FLD AUTO: 7.54 K/UL — SIGNIFICANT CHANGE UP (ref 3.8–10.5)
WBC # FLD AUTO: 7.54 K/UL — SIGNIFICANT CHANGE UP (ref 3.8–10.5)

## 2023-11-29 PROCEDURE — 93010 ELECTROCARDIOGRAM REPORT: CPT

## 2023-11-29 NOTE — H&P PST ADULT - ASSESSMENT
renal calculus  CAPRINI SCORE    AGE RELATED RISK FACTORS                                                       MOBILITY RELATED FACTORS  [ x] Age 41-60 years                                            (1 Point)                  [ ] Bed rest                                                        (1 Point)  [ ] Age: 61-74 years                                           (2 Points)                [ ] Plaster cast                                                   (2 Points)  [ ] Age= 75 years                                              (3 Points)                 [ ] Bed bound for more than 72 hours                   (2 Points)    DISEASE RELATED RISK FACTORS                                               GENDER SPECIFIC FACTORS  [ ] Edema in the lower extremities                       (1 Point)                  [ ] Pregnancy                                                     (1 Point)  [ ] Varicose veins                                               (1 Point)                  [ ] Post-partum < 6 weeks                                   (1 Point)             [ x] BMI > 25 Kg/m2                                            (1 Point)                  [ ] Hormonal therapy  or oral contraception            (1 Point)                 [ ] Sepsis (in the previous month)                        (1 Point)                  [ ] History of pregnancy complications  [ ] Pneumonia or serious lung disease                                               [ ] Unexplained or recurrent                       (1 Point)           (in the previous month)                               (1 Point)  [ ] Abnormal pulmonary function test                     (1 Point)                 SURGERY RELATED RISK FACTORS  [ ] Acute myocardial infarction                              (1 Point)                 [ ]  Section                                            (1 Point)  [ ] Congestive heart failure (in the previous month)  (1 Point)                 [x ] Minor surgery                                                 (1 Point)   [ ] Inflammatory bowel disease                             (1 Point)                 [ ] Arthroscopic surgery                                        (2 Points)  [ ] Central venous access                                    (2 Points)                [ ] General surgery lasting more than 45 minutes   (2 Points)       [ ] Stroke (in the previous month)                          (5 Points)               [ ] Elective arthroplasty                                        (5 Points)                                                                                                                                               HEMATOLOGY RELATED FACTORS                                                 TRAUMA RELATED RISK FACTORS  [ ] Prior episodes of VTE                                     (3 Points)                 [ ] Fracture of the hip, pelvis, or leg                       (5 Points)  [ ] Positive family history for VTE                         (3 Points)                 [ ] Acute spinal cord injury (in the previous month)  (5 Points)  [ ] Prothrombin 58942 A                                      (3 Points)                 [ ] Paralysis  (less than 1 month)                          (5 Points)  [ ] Factor V Leiden                                             (3 Points)                 [ ] Multiple Trauma within 1 month                         (5 Points)  [ ] Lupus anticoagulants                                     (3 Points)                                                           [ ] Anticardiolipin antibodies                                (3 Points)                                                       [ ] High homocysteine in the blood                      (3 Points)                                             [ ] Other congenital or acquired thrombophilia       (3 Points)                                                [ ] Heparin induced thrombocytopenia                  (3 Points)                                          Total Score [       3   ]  Caprini Score 0-2: Low risk, No VTE Prophylaxis required for most patient's, encourage ambulation  Caprini Score 3-6: At Risk, Pharmacologic VTE prophylaxis is indicated for most patients ( in the absence of a contraindication)  Caprini Score Greater than or = 7: High Risk , pharmacologic VTE is indicated for most patients ( in the absence of a contraindication)    Caprini score indicates that the patient is high risk for VTE event ( score 6 or greater). Surgical patient's in this group will benefit from both pharmacologic prophylaxis and intermittent compression devices . Surgical team will determine the balance between VTE  risk and bleeding risk and other clinical considerations

## 2023-11-29 NOTE — H&P PST ADULT - HISTORY OF PRESENT ILLNESS
54 yo male with recurrent renal stones - scheduled for cystoscopy , left renal lithotripsy   denies recent travels in the past 30 days. No fever, SOB, cough, flu like symptoms or body rash- covid screen

## 2023-11-30 LAB
CULTURE RESULTS: SIGNIFICANT CHANGE UP
CULTURE RESULTS: SIGNIFICANT CHANGE UP
SPECIMEN SOURCE: SIGNIFICANT CHANGE UP
SPECIMEN SOURCE: SIGNIFICANT CHANGE UP

## 2023-11-30 RX ORDER — SODIUM CHLORIDE 9 MG/ML
3 INJECTION INTRAMUSCULAR; INTRAVENOUS; SUBCUTANEOUS EVERY 8 HOURS
Refills: 0 | Status: DISCONTINUED | OUTPATIENT
Start: 2023-12-13 | End: 2023-12-13

## 2023-12-12 ENCOUNTER — TRANSCRIPTION ENCOUNTER (OUTPATIENT)
Age: 53
End: 2023-12-12

## 2023-12-13 ENCOUNTER — OUTPATIENT (OUTPATIENT)
Dept: OUTPATIENT SERVICES | Facility: HOSPITAL | Age: 53
LOS: 1 days | Discharge: HOME | End: 2023-12-13
Payer: MEDICAID

## 2023-12-13 ENCOUNTER — APPOINTMENT (OUTPATIENT)
Dept: UROLOGY | Facility: HOSPITAL | Age: 53
End: 2023-12-13

## 2023-12-13 ENCOUNTER — TRANSCRIPTION ENCOUNTER (OUTPATIENT)
Age: 53
End: 2023-12-13

## 2023-12-13 VITALS
HEART RATE: 80 BPM | TEMPERATURE: 98 F | RESPIRATION RATE: 16 BRPM | SYSTOLIC BLOOD PRESSURE: 137 MMHG | WEIGHT: 154.1 LBS | DIASTOLIC BLOOD PRESSURE: 90 MMHG | HEIGHT: 67 IN | OXYGEN SATURATION: 98 %

## 2023-12-13 VITALS
DIASTOLIC BLOOD PRESSURE: 93 MMHG | SYSTOLIC BLOOD PRESSURE: 151 MMHG | HEART RATE: 98 BPM | OXYGEN SATURATION: 98 % | RESPIRATION RATE: 15 BRPM

## 2023-12-13 DIAGNOSIS — Z98.890 OTHER SPECIFIED POSTPROCEDURAL STATES: Chronic | ICD-10-CM

## 2023-12-13 PROCEDURE — 52352 CYSTOURETERO W/STONE REMOVE: CPT | Mod: LT

## 2023-12-13 PROCEDURE — 74420 UROGRAPHY RTRGR +-KUB: CPT | Mod: 26

## 2023-12-13 DEVICE — LASER FIBER SOLTIVE 200: Type: IMPLANTABLE DEVICE | Status: FUNCTIONAL

## 2023-12-13 DEVICE — GUIDEWIRE SENSOR DUAL-FLEX NITINOL STRAIGHT .038" X 150CM: Type: IMPLANTABLE DEVICE | Status: FUNCTIONAL

## 2023-12-13 DEVICE — URETERAL STENT PERCUFLEX PLUS 6FR 26CM: Type: IMPLANTABLE DEVICE | Status: FUNCTIONAL

## 2023-12-13 DEVICE — URETERAL CATH OPEN END FLEXI-TIP 5FR .038" X 70CM: Type: IMPLANTABLE DEVICE | Status: FUNCTIONAL

## 2023-12-13 RX ORDER — ONDANSETRON 8 MG/1
4 TABLET, FILM COATED ORAL ONCE
Refills: 0 | Status: DISCONTINUED | OUTPATIENT
Start: 2023-12-13 | End: 2023-12-15

## 2023-12-13 RX ORDER — SODIUM CHLORIDE 9 MG/ML
1000 INJECTION, SOLUTION INTRAVENOUS
Refills: 0 | Status: DISCONTINUED | OUTPATIENT
Start: 2023-12-13 | End: 2023-12-15

## 2023-12-13 RX ORDER — IBUPROFEN 200 MG
1 TABLET ORAL
Qty: 0 | Refills: 0 | DISCHARGE

## 2023-12-13 RX ORDER — HYDROMORPHONE HYDROCHLORIDE 2 MG/ML
1 INJECTION INTRAMUSCULAR; INTRAVENOUS; SUBCUTANEOUS
Refills: 0 | Status: DISCONTINUED | OUTPATIENT
Start: 2023-12-13 | End: 2023-12-15

## 2023-12-13 RX ORDER — HYDROMORPHONE HYDROCHLORIDE 2 MG/ML
0.5 INJECTION INTRAMUSCULAR; INTRAVENOUS; SUBCUTANEOUS
Refills: 0 | Status: DISCONTINUED | OUTPATIENT
Start: 2023-12-13 | End: 2023-12-15

## 2023-12-13 RX ORDER — ACETAMINOPHEN 500 MG
2 TABLET ORAL
Qty: 0 | Refills: 0 | DISCHARGE

## 2023-12-13 RX ORDER — CIPROFLOXACIN LACTATE 400MG/40ML
1 VIAL (ML) INTRAVENOUS
Qty: 4 | Refills: 0
Start: 2023-12-13 | End: 2023-12-14

## 2023-12-13 RX ADMIN — HYDROMORPHONE HYDROCHLORIDE 0.5 MILLIGRAM(S): 2 INJECTION INTRAMUSCULAR; INTRAVENOUS; SUBCUTANEOUS at 19:58

## 2023-12-13 RX ADMIN — SODIUM CHLORIDE 125 MILLILITER(S): 9 INJECTION, SOLUTION INTRAVENOUS at 19:23

## 2023-12-13 RX ADMIN — HYDROMORPHONE HYDROCHLORIDE 0.5 MILLIGRAM(S): 2 INJECTION INTRAMUSCULAR; INTRAVENOUS; SUBCUTANEOUS at 20:15

## 2023-12-13 NOTE — ASU PATIENT PROFILE, ADULT - NS TRANSFER HEARING AID
[FreeTextEntry1] : rash [de-identified] : His rash is much better with triamcinolone. \par c/o hoarseness for 3 days with a mild cough. Has h/o gerd. \par  denies

## 2023-12-13 NOTE — ASU PATIENT PROFILE, ADULT - FALL HARM RISK - UNIVERSAL INTERVENTIONS
Bed in lowest position, wheels locked, appropriate side rails in place/Call bell, personal items and telephone in reach/Instruct patient to call for assistance before getting out of bed or chair/Non-slip footwear when patient is out of bed/[place] to call system/Physically safe environment - no spills, clutter or unnecessary equipment/Purposeful Proactive Rounding/Room/bathroom lighting operational, light cord in reach Bed in lowest position, wheels locked, appropriate side rails in place/Call bell, personal items and telephone in reach/Instruct patient to call for assistance before getting out of bed or chair/Non-slip footwear when patient is out of bed/[place] to call system/Physically safe environment - no spills, clutter or unnecessary equipment/Purposeful Proactive Rounding/Room/bathroom lighting operational, light cord in reach

## 2023-12-13 NOTE — ASU DISCHARGE PLAN (ADULT/PEDIATRIC) - PROVIDER TOKENS
PROVIDER:[TOKEN:[[id number]],FOLLOWUP:[1 week]] PROVIDER:[TOKEN:[[id number]],FOLLOWUP:[1 week]]

## 2023-12-13 NOTE — ASU DISCHARGE PLAN (ADULT/PEDIATRIC) - NS MD DC FALL RISK RISK
For information on Fall & Injury Prevention, visit: [email or web address] OR  [email or web address] OR  https://www.cdc.gov/steadi/patient.html For information on Fall & Injury Prevention, visit: [email or web address] OR  [email or web address] OR  https://www.cdc.gov/steadi/patient.html

## 2023-12-13 NOTE — ASU DISCHARGE PLAN (ADULT/PEDIATRIC) - CARE PROVIDER_API CALL
Earl Dick  Urology  [address]  [place] [id number]  Phone: [phone]  Fax: [phone]  Follow Up Time: 1 week   Earl Dick  Urology  [address]  [place] [id number]  Phone: [phone]  Fax: [phone]  Follow Up Time: 1 week

## 2023-12-13 NOTE — ASU DISCHARGE PLAN (ADULT/PEDIATRIC) - ASU DC SPECIAL INSTRUCTIONSFT
May take Tylenol and Advil for pain as needed.     May experience blood in urine and left flank pain until ureteral stent is removed     Please call your surgeon if any questions or concerns are to arise.    Please take antibiotics as prescribed

## 2023-12-18 PROBLEM — Z78.9 OTHER SPECIFIED HEALTH STATUS: Chronic | Status: ACTIVE | Noted: 2023-11-29

## 2023-12-19 ENCOUNTER — APPOINTMENT (OUTPATIENT)
Dept: UROLOGY | Facility: CLINIC | Age: 53
End: 2023-12-19

## 2023-12-19 ENCOUNTER — APPOINTMENT (OUTPATIENT)
Dept: UROLOGY | Facility: CLINIC | Age: 53
End: 2023-12-19
Payer: MEDICAID

## 2023-12-19 VITALS
OXYGEN SATURATION: 99 % | DIASTOLIC BLOOD PRESSURE: 88 MMHG | HEART RATE: 90 BPM | TEMPERATURE: 98 F | SYSTOLIC BLOOD PRESSURE: 150 MMHG

## 2023-12-19 DIAGNOSIS — N20.0 CALCULUS OF KIDNEY: ICD-10-CM

## 2023-12-19 DIAGNOSIS — Z87.891 PERSONAL HISTORY OF NICOTINE DEPENDENCE: ICD-10-CM

## 2023-12-19 PROCEDURE — 52000 CYSTOURETHROSCOPY: CPT

## 2023-12-19 RX ORDER — SULFAMETHOXAZOLE AND TRIMETHOPRIM 800; 160 MG/1; MG/1
800-160 TABLET ORAL TWICE DAILY
Qty: 2 | Refills: 0 | Status: ACTIVE | COMMUNITY
Start: 2023-12-19

## 2024-10-18 NOTE — PATIENT PROFILE ADULT - NSPROIMPLANTSMEDDEV_GEN_A_NUR
None
Plan: Patient will follow up in 4 weeks if unresolved
Detail Level: Zone
Render In Strict Bullet Format?: No
Continue Regimen: Fragrance free deodorant
Initiate Treatment: Strict sensitive skincare handout provided \\n\\ntriamcinolone acetonide 0.1 % topical cream BID\\nQuantity: 30.0 g  Days Supply: 30\\nSig: Apply BID to affected areas  x 2 weeks. Take a 2 week break. Repeat as needed for flares. Do not apply to face.
